# Patient Record
Sex: FEMALE
[De-identification: names, ages, dates, MRNs, and addresses within clinical notes are randomized per-mention and may not be internally consistent; named-entity substitution may affect disease eponyms.]

---

## 2019-05-06 ENCOUNTER — HOSPITAL ENCOUNTER (INPATIENT)
Dept: HOSPITAL 88 - FSED | Age: 74
LOS: 4 days | Discharge: HOME | DRG: 413 | End: 2019-05-10
Attending: INTERNAL MEDICINE | Admitting: INTERNAL MEDICINE
Payer: MEDICARE

## 2019-05-06 VITALS — SYSTOLIC BLOOD PRESSURE: 167 MMHG | DIASTOLIC BLOOD PRESSURE: 73 MMHG

## 2019-05-06 VITALS — SYSTOLIC BLOOD PRESSURE: 134 MMHG | DIASTOLIC BLOOD PRESSURE: 60 MMHG

## 2019-05-06 VITALS — SYSTOLIC BLOOD PRESSURE: 97 MMHG | DIASTOLIC BLOOD PRESSURE: 55 MMHG

## 2019-05-06 VITALS — WEIGHT: 133 LBS | HEIGHT: 62 IN | BODY MASS INDEX: 24.48 KG/M2

## 2019-05-06 DIAGNOSIS — Z79.4: ICD-10-CM

## 2019-05-06 DIAGNOSIS — E86.0: ICD-10-CM

## 2019-05-06 DIAGNOSIS — E11.9: ICD-10-CM

## 2019-05-06 DIAGNOSIS — K80.01: Primary | ICD-10-CM

## 2019-05-06 DIAGNOSIS — K82.8: ICD-10-CM

## 2019-05-06 DIAGNOSIS — K76.0: ICD-10-CM

## 2019-05-06 DIAGNOSIS — D64.9: ICD-10-CM

## 2019-05-06 PROCEDURE — 84466 ASSAY OF TRANSFERRIN: CPT

## 2019-05-06 PROCEDURE — 76705 ECHO EXAM OF ABDOMEN: CPT

## 2019-05-06 PROCEDURE — 82607 VITAMIN B-12: CPT

## 2019-05-06 PROCEDURE — 88304 TISSUE EXAM BY PATHOLOGIST: CPT

## 2019-05-06 PROCEDURE — 82728 ASSAY OF FERRITIN: CPT

## 2019-05-06 PROCEDURE — 82746 ASSAY OF FOLIC ACID SERUM: CPT

## 2019-05-06 PROCEDURE — 36415 COLL VENOUS BLD VENIPUNCTURE: CPT

## 2019-05-06 PROCEDURE — 82948 REAGENT STRIP/BLOOD GLUCOSE: CPT

## 2019-05-06 PROCEDURE — 74181 MRI ABDOMEN W/O CONTRAST: CPT

## 2019-05-06 PROCEDURE — 83540 ASSAY OF IRON: CPT

## 2019-05-06 PROCEDURE — 80048 BASIC METABOLIC PNL TOTAL CA: CPT

## 2019-05-06 PROCEDURE — 80053 COMPREHEN METABOLIC PANEL: CPT

## 2019-05-06 PROCEDURE — 99284 EMERGENCY DEPT VISIT MOD MDM: CPT

## 2019-05-06 PROCEDURE — 85025 COMPLETE CBC W/AUTO DIFF WBC: CPT

## 2019-05-06 PROCEDURE — 74150 CT ABDOMEN W/O CONTRAST: CPT

## 2019-05-06 PROCEDURE — 81003 URINALYSIS AUTO W/O SCOPE: CPT

## 2019-05-06 PROCEDURE — 80076 HEPATIC FUNCTION PANEL: CPT

## 2019-05-06 RX ADMIN — TAZOBACTAM SODIUM AND PIPERACILLIN SODIUM SCH GM: 375; 3 INJECTION, SOLUTION INTRAVENOUS at 22:00

## 2019-05-06 RX ADMIN — TAZOBACTAM SODIUM AND PIPERACILLIN SODIUM SCH GM: 375; 3 INJECTION, SOLUTION INTRAVENOUS at 18:27

## 2019-05-06 RX ADMIN — SODIUM CHLORIDE SCH MLS/HR: 9 INJECTION, SOLUTION INTRAVENOUS at 20:59

## 2019-05-06 NOTE — NUR
Spoke with Dr Gant via phone. Patient temp 101.4 orally, no order for tylenol. New order for 
Tylenol 650 mg po Q 6 hrs PRN temp or pain. Patient also requesting water or ice chips: keep 
patient NPO at this time.

## 2019-05-06 NOTE — XMS REPORT
Patient Summary Document

                             Created on: 2019



NEAL LUNA

External Reference #: [id number]

: 1945

Sex: Female



Demographics







                          Address                   [address] 

[place]  [address]

 

                          Home Phone                [phone]

 

                          Preferred Language        Unknown

 

                          Marital Status            Unknown

 

                          P[employer]al Affiliation     Unknown

 

                          Race                      Unknown

 

                                        Additional Race(s)  

 

                          Ethnic Group              Unknown





Author







                          Author                    [facility]

 

                          Address                   Unknown

 

                          Phone                     Unavailable







Care Team Providers







                    Care Team Member Name    Role                Phone

 

                    DICK WALDEN    Unavailable         Unavailable







Problems

This patient has no known problems.



Allergies, Adverse Reactions, Alerts

This patient has no known allergies or adverse reactions.



Medications

This patient has no known medications.



Results







           Test Description    Test Time    Test Comments    Text Results    Atomic Results    Result

 Comments

 

                 GALL BLADDER-HOPD    2019 16:44:00                                                      

                                                    [place] [address]      Patient Name: NEAL LUNA                                  
MR #: [id number]                     : 1945                            
      Age/Sex: 74/F  Acct #: [id number]                              Req #: 
[id number]  Adm Physician:                                                      
Ordered by: JOE WALDEN MD                            Report #: [id number]
       Location: [facility]                                    Room/Bed:              
      
___________________________________________________________________________________________________
   [id number]  Exam Date: 19          
                 Exam Time: 1550                                              
REPORT STATUS: Signed    EXAM:  GALL BLADDER-HOPD   DATE: 2019 12:00 AM   
 INDICATION:   Right upper quadrant pain   COMPARISON: CT abdomen and pelvis 
same day    TECHNIQUE: Transverse and longitudinal gray scale and color doppler 
sonographic   images of the upper abdomen were obtained.       FINDINGS:        
LIVER   12.9 cm in the right midclavicular line.   Normal echogenicity, normal 
contour, no masses.         GALLBLADDER   Multiple mobile shadowing echogenic c
alculi. No wall thickening or   pericholecystic fluid.   Negative sonographic 
Lizarraga's sign.      BILE DUCTS   No intra nor extra-hepatic biliary dilation.   
Common bile duct is mildly prominent, measuring 0.9 cm      PANCREAS: Visualized
portions are normal.      RIGHT KIDNEY: 9.7 cm   Echogenicity: Normal   
Collecting System:  No hydronephrosis   Stones:  None   Cyst/Mass: None         
VESSELS:   Aorta: Nonaneurysmal   Inferior Vena Cava: Patent   Main Portal Vein:
0.9 cm, normal size with hepatopetal flow.      FREE FLUID: None      
IMPRESSION:   Cholelithiasis without sonographic evidence of acute 
cholecystitis.      Mild prominence of the common bile duct may be age-related. 
Correlate with   serum bilirubin and consider outpatient MRCP for further 
evaluation if   warranted.       Signed by: Dr. Maurice Rashid M.D. on 2019 
4:48 PM        Dictated By: MAURICE RASHID MD  Electronically Signed By: MAURICE RASHID MD on 19  Transcribed By: LIZBET on 19       COPY 
TO:   JOE WALDEN MD               

 

                CT ABDOMEN WITHOUT-HOPD    2019 14:07:00                                                   

                                                       [place] [address]      Patient Name: NEAL LUNA                        
          MR #: [id number]                     : 1945                  
                Age/Sex: 74/F  Acct #: [id number]                             
Req #: [id number]  Adm Physician:                                               
      Ordered by: JOE WALDEN MD                            Report #: 
[id number]        Location: [facility]                                    Room/Bed:    
                
___________________________________________________________________________________________________
   Procedure: [id number] HOPD/CT ABDOMEN WITHOUT-HOPD  Exam Date: 19       
                    Exam Time: 1340                                             
REPORT STATUS: Signed    EXAMINATION: CT of the abdomen and pelvis without 
contrast.       TECHNIQUE: Spiral CT images of the abdomen and pelvis were 
performed from the   lung bases to the lesser trochanters.  No intravenous 
contrast was given per   referring physician request. Oral Gastrografin was 
administered. Coronal and   sagittal reformatted images were obtained.      
COMPARISON:  None.      CLINICAL HISTORY:Right lower quadrant pain and fever x2 
days           DISCUSSION: ABSENCE OF INTRAVENOUS CONTRAST DECREASES SENSITIVITY
FOR DETECTION   OF FOCAL LESIONS AND VASCULAR PATHOLOGY.      ABDOMEN/PELVIS:   
  LOWER THORAX: Minimal groundglass opacity in the dependent lower lobes   
compatible with subsegmental atelectasis.       HEPATOBILIARY:No focal hepatic 
lesion or intrahepatic biliary ductal   dilatation. There are multiple 
radiopaque calculi in the dependent portion of   the gallbladder, as well as 
within the gallbladder neck (series 2 image 29 and   series 301 image 42) 
without pericholecystic inflammation.      SPLEEN: No splenomegaly.      
PANCREAS: No focal masses or ductal dilatation.      ADRENALS: No adrenal 
nodules.      KIDNEYS/URETERS: No hydronephrosis, stones, or solid mass lesions.
     PELVIC ORGANS/BLADDER: Evaluation is limited due to beam hardening artifact
  from right femoral surgical hardware. The bladder is grossly unremarkable. The
  uterus is neutral in position with arcuate artery calcifications. No adnexal  
mass.      PERITONEUM/RETROPERITONEUM: No ascites. No pneumoperitoneum.      
LYMPH NODES: No intra-abdominal,retroperitoneal, pelvic or inguinal   
lymphadenopathy.      VESSELS: Limited evaluation without intravenous contrast. 
The abdominal aorta   is nonaneurysmal. Atherosclerotic calcification of the 
abdominopelvic arterial   system.      GI TRACT: The large bowel shows no 
distention or wall thickening. Gas and fecal   material is noted throughout. The
appendix is normal. The stomach is collapsed   with prominence of the rugal 
folds. No small bowel dilatation to suggest   obstruction.      BONES AND SOFT 
TISSUES: Surgical hardware in the proximal right femur. No   osseous destructive
lesions. Bilateral L4 pars interarticularis defects with 8   mm anterolisthesis 
of L4 over L5. No focal soft tissue abnormalities.      IMPRESSION:       No 
acute intra-abdominal or pelvic CT abnormalities.      Cholelithiasis without CT
findings of acute cholecystitis.      Atherosclerotic vascular disease.      
Signed by: Dr. Maurice Rashid M.D. on 2019 2:17 PM        Dictated By: MAURICE RASHID MD  Electronically Signed By: MAURICE RASHID MD on 19 1415  
Transcribed By: LIZBET on 19 1417       COPY TO:   JOE WALDEN MD

## 2019-05-06 NOTE — DIAGNOSTIC IMAGING REPORT
EXAMINATION: CT of the abdomen and pelvis without contrast.

 

TECHNIQUE: Spiral CT images of the abdomen and pelvis were performed from the

lung bases to the lesser trochanters.  No intravenous contrast was given per

referring physician request. Oral Gastrografin was administered. Coronal and

sagittal reformatted images were obtained.



COMPARISON:  None.



CLINICAL HISTORY:Right lower quadrant pain and fever x2 days

     

DISCUSSION: ABSENCE OF INTRAVENOUS CONTRAST DECREASES SENSITIVITY FOR DETECTION

OF FOCAL LESIONS AND VASCULAR PATHOLOGY.



ABDOMEN/PELVIS:



LOWER THORAX: Minimal groundglass opacity in the dependent lower lobes

compatible with subsegmental atelectasis. 



HEPATOBILIARY:No focal hepatic lesion or intrahepatic biliary ductal

dilatation. There are multiple radiopaque calculi in the dependent portion of

the gallbladder, as well as within the gallbladder neck (series 2 image 29 and

series 301 image 42) without pericholecystic inflammation.



SPLEEN: No splenomegaly.



PANCREAS: No focal masses or ductal dilatation.



ADRENALS: No adrenal nodules.



KIDNEYS/URETERS: No hydronephrosis, stones, or solid mass lesions.



PELVIC ORGANS/BLADDER: Evaluation is limited due to beam hardening artifact

from right femoral surgical hardware. The bladder is grossly unremarkable. The

uterus is neutral in position with arcuate artery calcifications. No adnexal

mass.



PERITONEUM/RETROPERITONEUM: No ascites. No pneumoperitoneum.



LYMPH NODES: No intra-abdominal,retroperitoneal, pelvic or inguinal

lymphadenopathy.



VESSELS: Limited evaluation without intravenous contrast. The abdominal aorta

is nonaneurysmal. Atherosclerotic calcification of the abdominopelvic arterial

system.



GI TRACT: The large bowel shows no distention or wall thickening. Gas and fecal

material is noted throughout. The appendix is normal. The stomach is collapsed

with prominence of the rugal folds. No small bowel dilatation to suggest

obstruction.



BONES AND SOFT TISSUES: Surgical hardware in the proximal right femur. No

osseous destructive lesions. Bilateral L4 pars interarticularis defects with 8

mm anterolisthesis of L4 over L5. No focal soft tissue abnormalities.



IMPRESSION: 



No acute intra-abdominal or pelvic CT abnormalities.



Cholelithiasis without CT findings of acute cholecystitis.



Atherosclerotic vascular disease.



Signed by: Dr. Steve Colin M.D. on 5/6/2019 2:17 PM

## 2019-05-06 NOTE — NUR
Patient arrived to unit per ems from [facility] with daughter at side. Transferred to bed. 
Oriented to room and environment. Instructed to call for assistance or on the onset of pain 
or SOB. Call light within reach.

## 2019-05-06 NOTE — NUR
Daughter Domitila to bring home medications for correct dosage on medications and type of 
insulin. Education provided on importance of correct list of home medications. Will bring 
list.

## 2019-05-06 NOTE — DIAGNOSTIC IMAGING REPORT
EXAM: US GALL BLADDER-HOPD

DATE: 5/6/2019 12:00 AM  

INDICATION:   Right upper quadrant pain

COMPARISON: CT abdomen and pelvis same day 

TECHNIQUE: Transverse and longitudinal gray scale and color doppler sonographic

images of the upper abdomen were obtained. 



FINDINGS:     

LIVER

12.9 cm in the right midclavicular line.

Normal echogenicity, normal contour, no masses.





GALLBLADDER

Multiple mobile shadowing echogenic calculi. No wall thickening or

pericholecystic fluid.

Negative sonographic Lizarraga's sign.



BILE DUCTS

No intra nor extra-hepatic biliary dilation.

Common bile duct is mildly prominent, measuring 0.9 cm



PANCREAS: Visualized portions are normal.



RIGHT KIDNEY: 9.7 cm

Echogenicity: Normal

Collecting System:  No hydronephrosis

Stones:  None

Cyst/Mass: None





VESSELS:

Aorta: Nonaneurysmal

Inferior Vena Cava: Patent

Main Portal Vein: 0.9 cm, normal size with hepatopetal flow.



FREE FLUID: None



IMPRESSION:

Cholelithiasis without sonographic evidence of acute cholecystitis.



Mild prominence of the common bile duct may be age-related. Correlate with

serum bilirubin and consider outpatient MRCP for further evaluation if

warranted. 



Signed by: Dr. Steve Colin M.D. on 5/6/2019 4:48 PM

## 2019-05-06 NOTE — NUR
Patient requesting water or ice chips. Informed patient that current order for NPO, will 
call MD to check. Verbalized understanding.

## 2019-05-07 VITALS — SYSTOLIC BLOOD PRESSURE: 104 MMHG | DIASTOLIC BLOOD PRESSURE: 47 MMHG

## 2019-05-07 VITALS — DIASTOLIC BLOOD PRESSURE: 52 MMHG | SYSTOLIC BLOOD PRESSURE: 104 MMHG

## 2019-05-07 VITALS — SYSTOLIC BLOOD PRESSURE: 108 MMHG | DIASTOLIC BLOOD PRESSURE: 55 MMHG

## 2019-05-07 VITALS — DIASTOLIC BLOOD PRESSURE: 47 MMHG | SYSTOLIC BLOOD PRESSURE: 104 MMHG

## 2019-05-07 VITALS — DIASTOLIC BLOOD PRESSURE: 48 MMHG | SYSTOLIC BLOOD PRESSURE: 96 MMHG

## 2019-05-07 VITALS — SYSTOLIC BLOOD PRESSURE: 99 MMHG | DIASTOLIC BLOOD PRESSURE: 50 MMHG

## 2019-05-07 VITALS — DIASTOLIC BLOOD PRESSURE: 57 MMHG | SYSTOLIC BLOOD PRESSURE: 115 MMHG

## 2019-05-07 LAB
ALBUMIN SERPL-MCNC: 2.5 G/DL (ref 3.5–5)
ALBUMIN/GLOB SERPL: 0.8 {RATIO} (ref 0.8–2)
ALP SERPL-CCNC: 101 IU/L (ref 40–150)
ALT SERPL-CCNC: 11 IU/L (ref 0–55)
ANION GAP SERPL CALC-SCNC: 11.1 MMOL/L (ref 8–16)
BASOPHILS # BLD AUTO: 0.1 10*3/UL (ref 0–0.1)
BASOPHILS NFR BLD AUTO: 0.6 % (ref 0–1)
BUN SERPL-MCNC: 34 MG/DL (ref 7–26)
BUN/CREAT SERPL: 23 (ref 6–25)
CALCIUM SERPL-MCNC: 8.3 MG/DL (ref 8.4–10.2)
CHLORIDE SERPL-SCNC: 102 MMOL/L (ref 98–107)
CO2 SERPL-SCNC: 24 MMOL/L (ref 22–29)
DEPRECATED NEUTROPHILS # BLD AUTO: 9.2 10*3/UL (ref 2.1–6.9)
EGFRCR SERPLBLD CKD-EPI 2021: 35 ML/MIN (ref 60–?)
EOSINOPHIL # BLD AUTO: 0.3 10*3/UL (ref 0–0.4)
EOSINOPHIL NFR BLD AUTO: 2.3 % (ref 0–6)
ERYTHROCYTE [DISTWIDTH] IN CORD BLOOD: 12.7 % (ref 11.7–14.4)
GLOBULIN PLAS-MCNC: 3.1 G/DL (ref 2.3–3.5)
GLUCOSE SERPLBLD-MCNC: 177 MG/DL (ref 74–118)
HCT VFR BLD AUTO: 27.1 % (ref 34.2–44.1)
HGB BLD-MCNC: 8.9 G/DL (ref 12–16)
LYMPHOCYTES # BLD: 1.2 10*3/UL (ref 1–3.2)
LYMPHOCYTES NFR BLD AUTO: 10 % (ref 18–39.1)
MCH RBC QN AUTO: 30.6 PG (ref 28–32)
MCHC RBC AUTO-ENTMCNC: 32.8 G/DL (ref 31–35)
MCV RBC AUTO: 93.1 FL (ref 81–99)
MONOCYTES # BLD AUTO: 1.2 10*3/UL (ref 0.2–0.8)
MONOCYTES NFR BLD AUTO: 9.6 % (ref 4.4–11.3)
NEUTS SEG NFR BLD AUTO: 77 % (ref 38.7–80)
PLATELET # BLD AUTO: 202 X10E3/UL (ref 140–360)
POTASSIUM SERPL-SCNC: 4.1 MMOL/L (ref 3.5–5.1)
RBC # BLD AUTO: 2.91 X10E6/UL (ref 3.6–5.1)
SODIUM SERPL-SCNC: 133 MMOL/L (ref 136–145)

## 2019-05-07 RX ADMIN — SODIUM CHLORIDE SCH MLS/HR: 9 INJECTION, SOLUTION INTRAVENOUS at 11:09

## 2019-05-07 RX ADMIN — TAZOBACTAM SODIUM AND PIPERACILLIN SODIUM SCH GM: 375; 3 INJECTION, SOLUTION INTRAVENOUS at 11:09

## 2019-05-07 RX ADMIN — TAZOBACTAM SODIUM AND PIPERACILLIN SODIUM SCH GM: 375; 3 INJECTION, SOLUTION INTRAVENOUS at 17:30

## 2019-05-07 RX ADMIN — INSULIN LISPRO SCH UNIT: 100 INJECTION, SOLUTION INTRAVENOUS; SUBCUTANEOUS at 21:00

## 2019-05-07 RX ADMIN — SODIUM CHLORIDE SCH MLS/HR: 9 INJECTION, SOLUTION INTRAVENOUS at 20:57

## 2019-05-07 RX ADMIN — SODIUM CHLORIDE SCH MLS/HR: 9 INJECTION, SOLUTION INTRAVENOUS at 01:34

## 2019-05-07 RX ADMIN — SODIUM CHLORIDE SCH MLS/HR: 9 INJECTION, SOLUTION INTRAVENOUS at 17:51

## 2019-05-07 RX ADMIN — INSULIN LISPRO SCH UNIT: 100 INJECTION, SOLUTION INTRAVENOUS; SUBCUTANEOUS at 16:30

## 2019-05-07 RX ADMIN — TAZOBACTAM SODIUM AND PIPERACILLIN SODIUM SCH GM: 375; 3 INJECTION, SOLUTION INTRAVENOUS at 01:34

## 2019-05-07 RX ADMIN — INSULIN LISPRO SCH UNIT: 100 INJECTION, SOLUTION INTRAVENOUS; SUBCUTANEOUS at 12:25

## 2019-05-07 NOTE — NUR
Patient arrived to the floor via wheelchair. She is being transferred from room 175. She is 
awake alert and oriented x3. Not in any distress. She has several family members present in 
the room. Upper sorbian speaking only.  was used to orient patient and family to the 
room and NPO status after midnight and procedure tomorrow. Patient and family verbalized 
understanding. They deny needing anything at this time, call light in reach

## 2019-05-07 NOTE — NUR
RECEIVED PATIENT. PATIENT IS AAOX3. RESP EVEN AND UNLABORED. NO ACUTE DISTRESS NOTED. 
PATIENT DENIES OF ANY PAIN OR DISCOMFORT AT THIS TIME. IV FLUID INFUSING. FAMILY AT BED 
SIDE. BED LOW/LOCKED. CONTINUE TO MONITOR CLOSELY

## 2019-05-07 NOTE — NUR
patient back from MRI MRCP, Stable, transferred to [facility], not in any distress

## 2019-05-07 NOTE — HISTORY AND PHYSICAL
CHIEF COMPLAINT:  Abdominal pain, right upper quadrant for x2 days.

 

HISTORY OF PRESENT ILLNESS:  The patient is a 74-year-old female with right upper

quadrant abdominal pain associated with nausea and vomiting.  This has been going on for

the past 2-3 days.  The patient came to the hospital.  Signs and symptoms are consistent

with acute cholecystitis.  Patient does have fever of 101-102 Fahrenheit.  The patient

is stable at this time pending for surgical intervention. 

 

PAST MEDICAL HISTORY:  Diabetes mellitus type 2, chronic kidney disease.

 

PAST SURGICAL HISTORY:  Foot surgery.

 

SOCIAL HISTORY:  The patient does not smoke or use alcohol, no recreational drugs.

 

ALLERGIES:  NO KNOWN ALLERGIES.

 

HOME MEDICATION:  Glyburide and metformin.

 

REVIEW OF SYSTEMS:

Abdominal pain, nausea and vomiting.

 

PHYSICAL EXAMINATION:

VITAL SIGNS:  Temperature is 101.4, blood pressure 142/51, pulse rate is 109,

respirations 18. 

GENERAL:  The patient is not in acute distress.  She is in pain. 

HEENT:  Normocephalic, atraumatic.  Anicteric. 

NECK:  Supple grossly. 

PULMONARY:  Diminished breath sounds bilaterally. 

CARDIOVASCULAR:  S1 and S2.  Regular rate and rhythm. 

ABDOMEN:  Tenderness with some guarding in right upper quadrant and right mid quadrant. 

EXTREMITIES:  No cyanosis or edema. 

NEUROLOGIC:  No gross focal deficit.

LABORATORY DATA:  WBC is 11.9, hemoglobin 8.9, hematocrit 27.1, and platelets 202. 

 

Chemistry; sodium 133, potassium 4.1, chloride 102, bicarb 24, BUN is 34, creatinine

1.47, glucose 177. 

 

IMAGING:  Abdominal CT scan showed consistent with cholelithiasis.

 

IMPRESSION:  Acute cholecystitis associated with pain, fever, and leukocytosis.

 

PLAN:  Dr. Kapoor will take the patient to have her gallbladder taken out.  In the

meantime, continue with IV antibiotics.  Tylenol.  Check blood culture. 

 

 

 

 

______________________________

MD CHEO Karimi/PREM

D:  05/07/2019 08:32:52

T:  05/07/2019 15:58:07

Job #:  [id number]

## 2019-05-07 NOTE — DIAGNOSTIC IMAGING REPORT
MRCP



CPT code: 21297



History: Abdominal pain, right upper quadrant pain



Comparison: None.



Technique: Multiplanar, multisequence images of the abdomen were obtained per

MRCP protocol.   3D volume rendered reformation images of the biliary tree were

performed.  No intravenous gadolinium was administered.



Findings: 

Multiple images are motion degraded.



Biliary tree: 

The intrahepatic ducts are distended, particularly in the left lobe without

beading or narrowing. The proximal most portion of the right common hepatic

duct is mildly distended. The common hepatic duct measures approximately 6 mm

in diameter.

The cystic duct is poorly visualized.

The proximal common bile duct measures 10 mm. The mid common bile duct measures

8 mm. The distal common bile duct measures 7 mm with squaring of the shoulders

at the ampulla. There are no intraluminal filling defects.



Pancreas duct: The proximal pancreas duct in the head measures 4 mm. The

pancreas duct in the body and tail are not dilated.



Gallbladder: Present and is very well-distended. A dependently layering

gallstone measures 7 mm. No gallbladder wall thickening. There is a trace

amount of nonspecific pericholecystic fluid. No edema of the adjacent hepatic

parenchyma.



Liver: No discrete mass. Decreased signal on opposed phase sequence suggestive

of steatosis.

 

Spleen: Normal size and signal. No mass.



Pancreas: Normal T2 signal. No mass. 

Kidneys: No hydronephrosis. No discrete mass. 

Adrenal glands: No mass 



Lymph nodes: No lymphadenopathy. There is focal increased T2 signal to the left

of the infrarenal aorta measuring 15 x 7 mm. This may be a part of the

parasympathetic ganglion. No defined lymph nodes.



Bowel: Stomach is normal. The visualized portions of the small bowel

demonstrate a diverticulum in the third portion of the duodenum. Remainder of

the small bowel is normal in diameter with normal wall thickness. The

visualized portions of the large bowel are normal in diameter with normal wall

thickness.



Vasculature: Great vessels are patent and normal in morphology.



Peritoneum/retroperitoneum: Trace amount of perihepatic and perinephric fluid.



Lung bases: Small pleural effusions and bibasilar atelectasis. Visualized

portion of the mediastinum are unremarkable.



Bones: Normal marrow signal. No focal osseous lesions.



IMPRESSION:



1. Diffuse distention of the common bile duct, proximal intrahepatic ducts, and

proximal pancreas duct without evidence of intraluminal filling defect,

beading, or narrowing. No evidence of choledocholithiasis. Ampullary stenosis

cannot be excluded. No obstructing masses in the pancreas head.



2. Well distended gallbladder containing subcentimeter gallstone. Gallbladder

distention may be from fasting state. HIDA scan in nuclear medicine can assess

gallbladder function.



3.  Mild steatosis.



4.  Trace pleural effusions and small amount of abdominal ascites.



Thank you for your referral. 



Signed by: Dr. Christiana Liu MD on 5/7/2019 2:20 PM

## 2019-05-07 NOTE — CONSULTATION
DATE OF CONSULTATION:  05/07/2019  

 

ADDITIONAL REFERRING PHYSICIAN:  Dr. Blaise Samaniego.

 

HISTORY OF PRESENT ILLNESS:  The patient is a 74-year-old female presenting with

complaints of right upper quadrant abdominal pain.  Says the pain started about three

days ago, but was somewhat less now.  She says the pain radiated to her back.  She had

no associated nausea or vomiting.  She had some diarrhea and also some fever.

Evaluation in the emergency room revealed gallstones, but no inflammation around the

gallbladder on CT or ultrasound.  The patient says she has had similar symptoms

previously.  There are no symptoms of jaundice.  Evaluation in the emergency room did

reveal gallstones. 

 

PAST MEDICAL HISTORY:  Significant for diabetes.

 

PAST SURGICAL HISTORY:  The only previous surgery is foot surgery.

 

ALLERGIES:  SHE HAS NO KNOWN ALLERGIES.

 

MEDICATIONS:  At home; glyburide, metformin, and insulin.

 

FAMILY HISTORY:  Noncontributory.

 

SOCIAL HISTORY:  The patient does not smoke cigarettes.  Does not drink alcohol.

 

REVIEW OF SYSTEMS:

As stated above, otherwise was negative.

 

PHYSICAL EXAMINATION:

GENERAL:  The patient is awake, alert, in no distress. 

VITAL SIGNS:  Revealed temperature 101.4 last evening.  She is afebrile now.  Heart rate

is normal.  Blood pressure is normal. 

HEENT:  There was no scleral icterus. 

NECK:  No masses. 

LUNGS:  Equal breath sounds, clear bilaterally. 

CARDIAC:  Regular rate and rhythm with no murmur. 

ABDOMEN:  Tender in the right upper quadrant.  There was no mass.  There was no

organomegaly.  There were no signs of peritonitis. 

EXTREMITIES:  Have no edema. 

NEUROLOGIC:  Grossly intact.

LABS:  White blood cell count is 12,000, hemoglobin 8.9, and hematocrit 27.  Chemistries

revealed mildly elevated BUN and creatinine of 34 and 1.47.  The bilirubin was 1.1.

Other liver function tests were normal. 

 

ASSESSMENT:  A 74-year-old female with right upper quadrant abdominal pain radiating to

her back with fever and findings of gallstones on imaging studies, likely this is due to

cholecystitis.  She is to be evaluated further with MRCP, which will likely benefit from

cholecystectomy.  This was explained to the patient and the proposed surgery was

explained to the patient. 

 

Thank you for asking me to see Ms. Lee.

 

 

 

 

______________________________

MD PADILLA Alves/PREM

D:  05/07/2019 06:33:58

T:  05/07/2019 13:18:54

Job #:  [id number]

## 2019-05-08 VITALS — SYSTOLIC BLOOD PRESSURE: 116 MMHG | DIASTOLIC BLOOD PRESSURE: 55 MMHG

## 2019-05-08 VITALS — DIASTOLIC BLOOD PRESSURE: 65 MMHG | SYSTOLIC BLOOD PRESSURE: 145 MMHG

## 2019-05-08 VITALS — SYSTOLIC BLOOD PRESSURE: 112 MMHG | DIASTOLIC BLOOD PRESSURE: 56 MMHG

## 2019-05-08 VITALS — DIASTOLIC BLOOD PRESSURE: 54 MMHG | SYSTOLIC BLOOD PRESSURE: 109 MMHG

## 2019-05-08 VITALS — SYSTOLIC BLOOD PRESSURE: 112 MMHG | DIASTOLIC BLOOD PRESSURE: 57 MMHG

## 2019-05-08 VITALS — DIASTOLIC BLOOD PRESSURE: 56 MMHG | SYSTOLIC BLOOD PRESSURE: 123 MMHG

## 2019-05-08 LAB
ALBUMIN SERPL-MCNC: 2.3 G/DL (ref 3.5–5)
ALBUMIN/GLOB SERPL: 0.8 {RATIO} (ref 0.8–2)
ALP SERPL-CCNC: 90 IU/L (ref 40–150)
ALT SERPL-CCNC: 7 IU/L (ref 0–55)
ANION GAP SERPL CALC-SCNC: 14 MMOL/L (ref 8–16)
BASOPHILS # BLD AUTO: 0.1 10*3/UL (ref 0–0.1)
BASOPHILS NFR BLD AUTO: 0.5 % (ref 0–1)
BUN SERPL-MCNC: 19 MG/DL (ref 7–26)
BUN/CREAT SERPL: 18 (ref 6–25)
CALCIUM SERPL-MCNC: 8.2 MG/DL (ref 8.4–10.2)
CHLORIDE SERPL-SCNC: 107 MMOL/L (ref 98–107)
CO2 SERPL-SCNC: 20 MMOL/L (ref 22–29)
DEPRECATED NEUTROPHILS # BLD AUTO: 6.5 10*3/UL (ref 2.1–6.9)
EGFRCR SERPLBLD CKD-EPI 2021: 50 ML/MIN (ref 60–?)
EOSINOPHIL # BLD AUTO: 0.5 10*3/UL (ref 0–0.4)
EOSINOPHIL NFR BLD AUTO: 5.8 % (ref 0–6)
ERYTHROCYTE [DISTWIDTH] IN CORD BLOOD: 12.9 % (ref 11.7–14.4)
GLOBULIN PLAS-MCNC: 3 G/DL (ref 2.3–3.5)
GLUCOSE SERPLBLD-MCNC: 121 MG/DL (ref 74–118)
HCT VFR BLD AUTO: 26.9 % (ref 34.2–44.1)
HGB BLD-MCNC: 8.6 G/DL (ref 12–16)
LYMPHOCYTES # BLD: 1.2 10*3/UL (ref 1–3.2)
LYMPHOCYTES NFR BLD AUTO: 12.9 % (ref 18–39.1)
MCH RBC QN AUTO: 30.7 PG (ref 28–32)
MCHC RBC AUTO-ENTMCNC: 32 G/DL (ref 31–35)
MCV RBC AUTO: 96.1 FL (ref 81–99)
MONOCYTES # BLD AUTO: 0.9 10*3/UL (ref 0.2–0.8)
MONOCYTES NFR BLD AUTO: 9.5 % (ref 4.4–11.3)
NEUTS SEG NFR BLD AUTO: 70.9 % (ref 38.7–80)
PLATELET # BLD AUTO: 194 X10E3/UL (ref 140–360)
POTASSIUM SERPL-SCNC: 4 MMOL/L (ref 3.5–5.1)
RBC # BLD AUTO: 2.8 X10E6/UL (ref 3.6–5.1)
SODIUM SERPL-SCNC: 137 MMOL/L (ref 136–145)

## 2019-05-08 PROCEDURE — 0FT44ZZ RESECTION OF GALLBLADDER, PERCUTANEOUS ENDOSCOPIC APPROACH: ICD-10-PCS | Performed by: SURGERY

## 2019-05-08 PROCEDURE — 0FJB0ZZ INSPECTION OF HEPATOBILIARY DUCT, OPEN APPROACH: ICD-10-PCS | Performed by: SURGERY

## 2019-05-08 RX ADMIN — SODIUM CHLORIDE SCH MLS/HR: 9 INJECTION, SOLUTION INTRAVENOUS at 22:27

## 2019-05-08 RX ADMIN — INSULIN LISPRO SCH UNIT: 100 INJECTION, SOLUTION INTRAVENOUS; SUBCUTANEOUS at 16:30

## 2019-05-08 RX ADMIN — SODIUM CHLORIDE SCH MLS/HR: 9 INJECTION, SOLUTION INTRAVENOUS at 15:21

## 2019-05-08 RX ADMIN — TAZOBACTAM SODIUM AND PIPERACILLIN SODIUM SCH GM: 375; 3 INJECTION, SOLUTION INTRAVENOUS at 19:14

## 2019-05-08 RX ADMIN — SODIUM CHLORIDE SCH MLS/HR: 9 INJECTION, SOLUTION INTRAVENOUS at 06:13

## 2019-05-08 RX ADMIN — INSULIN LISPRO SCH UNIT: 100 INJECTION, SOLUTION INTRAVENOUS; SUBCUTANEOUS at 21:00

## 2019-05-08 RX ADMIN — TAZOBACTAM SODIUM AND PIPERACILLIN SODIUM SCH GM: 375; 3 INJECTION, SOLUTION INTRAVENOUS at 03:30

## 2019-05-08 RX ADMIN — INSULIN LISPRO SCH UNIT: 100 INJECTION, SOLUTION INTRAVENOUS; SUBCUTANEOUS at 07:30

## 2019-05-08 RX ADMIN — TAZOBACTAM SODIUM AND PIPERACILLIN SODIUM SCH GM: 375; 3 INJECTION, SOLUTION INTRAVENOUS at 09:33

## 2019-05-08 RX ADMIN — INSULIN LISPRO SCH UNIT: 100 INJECTION, SOLUTION INTRAVENOUS; SUBCUTANEOUS at 11:30

## 2019-05-08 NOTE — NUR
Patient returned from OR, S/P Lap Herminia, pains well managed, on ADA diet at this time and 
call light within reach, VSS and will monitor.

## 2019-05-08 NOTE — NUR
Received patient this morning and in bed, no resp distress, rounds by attending and consult 
to Dr. Castillo and called at this time. Call to Dr. Kapoor to clarify if patient will need 
ERCP per Dr. Gant prior to surgery. Will monitor.

## 2019-05-08 NOTE — OPERATIVE REPORT
DATE OF PROCEDURE:  05/08/2019

 

SURGEON:  Steve Kapoor MD

 

PREOPERATIVE DIAGNOSES:  Acute cholecystitis, cholelithiasis.

 

POSTOPERATIVE DIAGNOSES:  Acute cholecystitis, cholelithiasis.

 

PROCEDURES:  Diagnostic laparoscopy, laparoscopic cholecystectomy, attempted

cholangiogram. 

 

ASSISTANT:  None.

 

ANESTHESIA:  General.

 

INDICATIONS AND FINDINGS:  The patient is a 74-year-old female, admitted to the hospital

with complaints of fever and right upper quadrant abdominal pain.  Workup revealed

gallstones.  At surgery, the patient's gallbladder was edematous.  It was having at

least one stone.  Cystic duct was also edematous.  An attempted cholangiogram was made,

but cholangiocatheter would not pass, so there was no definite stone within the cystic

duct.  Common bile duct was well seen and this was about 8 mm in diameter.  Liver

appeared normal.  There were some adhesions involving the omentum and lower abdomen

otherwise appeared normal. 

 

TECHNIQUE:  After adequate general endotracheal anesthesia with the patient in supine

position, the abdomen was prepped and draped in sterile fashion with ChloraPrep

solution.  Skin in the umbilicus was infiltrated with 0.5% Marcaine.  Incision made in

the umbilicus, abdominal wall was elevated, and Veress needle was introduced.

Pneumoperitoneum was then created.  A 10 mm trocar and cannula was then passed through

the umbilical wound.  Laparoscopic camera was introduced.  Initial laparoscopy revealed

some adhesions involving the omentum.  Gallbladder was noted to be distended and

somewhat edematous.  Liver, stomach, and lower abdomen appeared normal.  A 10 mm trocar

and cannula was placed in the epigastrium, two 5 mm trocars and cannulas were placed in

right upper quadrant; these were placed under direct vision.  Fundus of the gallbladder

was grasped and retracted superiorly.  There was some adhesions over the neck of the

gallbladder involving the omentum which were lysed.  Neck of the gallbladder was grasped

and retracted laterally.  Peritoneum over the neck of the gallbladder was incised.  The

gallbladder cystic duct junction was dissected free.  Cystic artery was also dissected

free.  Cystic artery was divided between hemoclips close to the gallbladder.  A clip was

then placed on the cystic duct close to the gallbladder.  An incision was made in the

cystic duct just below this clip and percutaneous entry with cholangiocatheter was

placed in the cystic duct, however, would not pass.  Cystic duct was milked back towards

the opening in the cystic duct, but there was no stone.  Several attempts were made to

pass the cholangiocatheter, but it would not pass.  There was some edema there, so the

edema may have been preventing the passage of the catheter.  Attempt to do the

cholangiogram was then aborted.  The cystic duct was then divided between hemoclips with

three clips being left on the common bile duct side.  The cystic duct was divided.

There was a posterior branch of cystic artery, which was divided between hemoclips.  The

gallbladder was then dissected free from the liver using scissors and electrocautery.

Once it was entirely free, it was placed into an Endopouch and brought out through the

epigastric cannula, at least one stone palpable.  Gallbladder bed was inspected for

hemostasis which was seen to be adequate.  It was irrigated with saline, all fluid

aspirated, inspected once again for hemostasis which was seen to be adequate.

Instruments and cannulas were removed.  Pneumoperitoneum was evacuated.  Wounds were

then closed.  Fascia in the umbilical and epigastric wound closed with 0 Vicryl.  Skin

to all wounds closed with staples.  Sterile dressings applied to each wound.  The

patient tolerated the procedure well.  Estimated 

blood loss was 10 mL.  There were no complications.  All counts were correct.  The

patient was taken to the recovery room in satisfactory condition. 

 

 

 

 

______________________________

MD PADILLA Alves/PREM

D:  05/08/2019 13:33:33

T:  05/08/2019 19:30:48

Job #:  [id number]

## 2019-05-08 NOTE — NUR
Call back from Dr. Kapoor and states does not think finding on MRCP is significant for ERCP 
but he will talk with Dr. Calderón still plans to proceed with surgery today

## 2019-05-09 VITALS — SYSTOLIC BLOOD PRESSURE: 112 MMHG | DIASTOLIC BLOOD PRESSURE: 56 MMHG

## 2019-05-09 VITALS — DIASTOLIC BLOOD PRESSURE: 60 MMHG | SYSTOLIC BLOOD PRESSURE: 134 MMHG

## 2019-05-09 VITALS — DIASTOLIC BLOOD PRESSURE: 51 MMHG | SYSTOLIC BLOOD PRESSURE: 104 MMHG

## 2019-05-09 VITALS — DIASTOLIC BLOOD PRESSURE: 58 MMHG | SYSTOLIC BLOOD PRESSURE: 117 MMHG

## 2019-05-09 VITALS — DIASTOLIC BLOOD PRESSURE: 51 MMHG | SYSTOLIC BLOOD PRESSURE: 103 MMHG

## 2019-05-09 VITALS — DIASTOLIC BLOOD PRESSURE: 53 MMHG | SYSTOLIC BLOOD PRESSURE: 107 MMHG

## 2019-05-09 LAB
ALBUMIN SERPL-MCNC: 2.1 G/DL (ref 3.5–5)
ALBUMIN/GLOB SERPL: 0.7 {RATIO} (ref 0.8–2)
ALP SERPL-CCNC: 81 IU/L (ref 40–150)
ALT SERPL-CCNC: 15 IU/L (ref 0–55)
ANION GAP SERPL CALC-SCNC: 16.3 MMOL/L (ref 8–16)
BASOPHILS # BLD AUTO: 0 10*3/UL (ref 0–0.1)
BASOPHILS NFR BLD AUTO: 0.1 % (ref 0–1)
BUN SERPL-MCNC: 25 MG/DL (ref 7–26)
BUN/CREAT SERPL: 18 (ref 6–25)
CALCIUM SERPL-MCNC: 8.2 MG/DL (ref 8.4–10.2)
CHLORIDE SERPL-SCNC: 110 MMOL/L (ref 98–107)
CO2 SERPL-SCNC: 13 MMOL/L (ref 22–29)
DEPRECATED NEUTROPHILS # BLD AUTO: 8.5 10*3/UL (ref 2.1–6.9)
EGFRCR SERPLBLD CKD-EPI 2021: 36 ML/MIN (ref 60–?)
EOSINOPHIL # BLD AUTO: 0 10*3/UL (ref 0–0.4)
EOSINOPHIL NFR BLD AUTO: 0.1 % (ref 0–6)
ERYTHROCYTE [DISTWIDTH] IN CORD BLOOD: 13 % (ref 11.7–14.4)
FERRITIN SERPL-MCNC: 223.72 NG/ML (ref 4.63–204)
FOLATE SERPL-MCNC: 14.4 NG/ML (ref 7–15.4)
GLOBULIN PLAS-MCNC: 2.9 G/DL (ref 2.3–3.5)
GLUCOSE SERPLBLD-MCNC: 331 MG/DL (ref 74–118)
HCT VFR BLD AUTO: 26 % (ref 34.2–44.1)
HGB BLD-MCNC: 8.4 G/DL (ref 12–16)
IRON SATN MFR SERPL: 12 % (ref 15–50)
IRON SERPL-MCNC: 12 UG/DL (ref 50–170)
LYMPHOCYTES # BLD: 0.7 10*3/UL (ref 1–3.2)
LYMPHOCYTES NFR BLD AUTO: 7.1 % (ref 18–39.1)
MCH RBC QN AUTO: 30.5 PG (ref 28–32)
MCHC RBC AUTO-ENTMCNC: 32.3 G/DL (ref 31–35)
MCV RBC AUTO: 94.5 FL (ref 81–99)
MONOCYTES # BLD AUTO: 0.7 10*3/UL (ref 0.2–0.8)
MONOCYTES NFR BLD AUTO: 7.4 % (ref 4.4–11.3)
NEUTS SEG NFR BLD AUTO: 84.8 % (ref 38.7–80)
PLATELET # BLD AUTO: 225 X10E3/UL (ref 140–360)
POTASSIUM SERPL-SCNC: 4.3 MMOL/L (ref 3.5–5.1)
RBC # BLD AUTO: 2.75 X10E6/UL (ref 3.6–5.1)
SODIUM SERPL-SCNC: 135 MMOL/L (ref 136–145)
TIBC SERPL-MCNC: 102 UG/DL (ref 261–478)
TRANSFERRIN SERPL-MCNC: 73 MG/DL (ref 180–382)
VIT B12 BLD-MCNC: > 2000 PG/ML (ref 213–816)

## 2019-05-09 RX ADMIN — SODIUM CHLORIDE SCH MLS/HR: 9 INJECTION, SOLUTION INTRAVENOUS at 19:26

## 2019-05-09 RX ADMIN — TAZOBACTAM SODIUM AND PIPERACILLIN SODIUM SCH GM: 375; 3 INJECTION, SOLUTION INTRAVENOUS at 18:32

## 2019-05-09 RX ADMIN — TAZOBACTAM SODIUM AND PIPERACILLIN SODIUM SCH GM: 375; 3 INJECTION, SOLUTION INTRAVENOUS at 02:43

## 2019-05-09 RX ADMIN — INSULIN LISPRO SCH UNIT: 100 INJECTION, SOLUTION INTRAVENOUS; SUBCUTANEOUS at 12:03

## 2019-05-09 RX ADMIN — TAZOBACTAM SODIUM AND PIPERACILLIN SODIUM SCH GM: 375; 3 INJECTION, SOLUTION INTRAVENOUS at 10:30

## 2019-05-09 RX ADMIN — INSULIN LISPRO SCH UNIT: 100 INJECTION, SOLUTION INTRAVENOUS; SUBCUTANEOUS at 20:34

## 2019-05-09 RX ADMIN — INSULIN LISPRO SCH UNIT: 100 INJECTION, SOLUTION INTRAVENOUS; SUBCUTANEOUS at 07:30

## 2019-05-09 RX ADMIN — INSULIN LISPRO SCH UNIT: 100 INJECTION, SOLUTION INTRAVENOUS; SUBCUTANEOUS at 16:30

## 2019-05-09 RX ADMIN — SODIUM CHLORIDE SCH MLS/HR: 9 INJECTION, SOLUTION INTRAVENOUS at 11:39

## 2019-05-09 NOTE — CONSULTATION
DATE OF CONSULTATION:  05/08/2019  

 

HISTORY OF PRESENT ILLNESS:  This is a 74-year-old lady, who presented to the hospital

because of abdominal pain.  This mainly is in the right upper quadrant area, along with

some nausea and vomiting.  The patient also has some fever.  Her workup so far reveal

that she has anemia with a hemoglobin around 8.9 on admission and also gallstones on the

CAT scan.  The ultrasound shows marked common duct dilatations.  However, the MRCP was

negative for filling defect, except there is mild steatosis.  She underwent

cholecystectomy earlier today and currently she is doing well. 

 

PAST MEDICAL HISTORY:  Her medical problem is significant for history of diabetes,

history of chronic renal disease, status post foot surgery. 

 

ALLERGIES:  NONE.

 

HOME MEDICATIONS:  Include glyburide as well as metformin.

 

SOCIAL HISTORY:  No smoking or alcohol use.

 

FAMILY HISTORY:  Noncontributory.

 

REVIEW OF SYSTEMS:

Denies any chest pain or shortness of breath.  Denies any dysphagia or odynophagia.

Denies any dysuria, hematuria, or any kind of syncopal episode. 

 

PHYSICAL EXAMINATION:

GENERAL:  The patient is awake, alert, appears to be stable, not in acute distress at

this point. 

VITAL SIGNS:  Afebrile currently with stable vital signs. 

HEAD, EYES, EARS, NOSE AND THROAT:  Normocephalic, atraumatic.  Sclerae anicteric. 

NECK:  Supple. 

HEART:  Regular. 

ABDOMEN:  Soft.  There is mild epigastric tenderness.  There is no rebound or mass. 

EXTREMITIES:  Demonstrates no clubbing or cyanosis.

LABORATORY VALUES:  As of today, liver enzymes appear to be normal.  BUN and creatinine

are normal.  Hemoglobin of 8.6, hematocrit 26.9.  CAT scan and ultrasound as mentioned

before. 

 

IMPRESSION:  

1. Abdominal pain, nausea, and vomiting.  The patient goes on with likely possibly

cholecystitis, status post cholecystectomy. 

2. Anemia.  Etiology is unclear.  There is no evidence of anemia at this point.

3. Diabetes.

 

RECOMMENDATION:  Continue postop care at this point, I would like to obtain iron study

and follow labs as well as clinically. 

 

 

 

 

______________________________

MD NICK Ness/PREM

D:  05/08/2019 19:07:25

T:  05/09/2019 00:20:49

Job #:  [id number]

 

cc:            Eleazar Gant MD

## 2019-05-09 NOTE — NUR
Spoke with Dr. Gr  and will want to see patient first before discharge and that patient 
will most likely discharge tomorrow.

## 2019-05-09 NOTE — NUR
Call to Dr. Calderón's office and to get clearance for discharge, Dr. Kapoor did rounds this 
morning and cleared patient for discharge.

## 2019-05-09 NOTE — NUR
Report given to Noris bagley at [facility]. Medications reconciled by MD and 
patient is ok for discharge per all disciplines, will transition to PO abx. Call ing 
ambulance at this time

## 2019-05-10 VITALS — SYSTOLIC BLOOD PRESSURE: 152 MMHG | DIASTOLIC BLOOD PRESSURE: 69 MMHG

## 2019-05-10 VITALS — SYSTOLIC BLOOD PRESSURE: 168 MMHG | DIASTOLIC BLOOD PRESSURE: 72 MMHG

## 2019-05-10 VITALS — DIASTOLIC BLOOD PRESSURE: 65 MMHG | SYSTOLIC BLOOD PRESSURE: 156 MMHG

## 2019-05-10 VITALS — SYSTOLIC BLOOD PRESSURE: 114 MMHG | DIASTOLIC BLOOD PRESSURE: 55 MMHG

## 2019-05-10 RX ADMIN — TAZOBACTAM SODIUM AND PIPERACILLIN SODIUM SCH GM: 375; 3 INJECTION, SOLUTION INTRAVENOUS at 09:31

## 2019-05-10 RX ADMIN — TAZOBACTAM SODIUM AND PIPERACILLIN SODIUM SCH GM: 375; 3 INJECTION, SOLUTION INTRAVENOUS at 02:09

## 2019-05-10 RX ADMIN — SODIUM CHLORIDE SCH MLS/HR: 9 INJECTION, SOLUTION INTRAVENOUS at 02:09

## 2019-05-10 RX ADMIN — INSULIN LISPRO SCH UNIT: 100 INJECTION, SOLUTION INTRAVENOUS; SUBCUTANEOUS at 07:30

## 2019-05-10 RX ADMIN — INSULIN LISPRO SCH UNIT: 100 INJECTION, SOLUTION INTRAVENOUS; SUBCUTANEOUS at 11:20

## 2019-05-10 NOTE — NUR
Received patient mid fowlers position, side rails upx2, call light within reach,  at 
bedside. AAOX3 to time, person, place, Romanian speaking. Respirations even and unlabored. O2 
1L NC. Denies sob. O2 removed. Will continue to monitor.

## 2019-05-10 NOTE — NUR
Visit made by the Spiritual Care Department Pastoral Visitor, Gibran Granados. PV provided 
pastoral presence, hospitality, and supportive listening. 

Pastoral Visitor informed pt/family of the scope of Chaplaincy Services and availability.



M[facility]



Spiritual Care Department

O: [phone]

Pager: [phone] ([phone])

## 2019-05-10 NOTE — NUR
HUYEN RECEIVED CALL FROM MARY IN SURGERY REGARDING PATIENT BEING RELEASED WITH NO HOME HEALTH. 
HUYEN INFORMED MARY THAT NO HOME HEALTH ORDER WAS WRITTEN PRIOR TO DISCHARGE. 



CM CALLED PATIENT DAUGHTER/ POA ARCADIO HE @ [phone]. CM LEFT MESSAGE WITH ARCADIO LUNA REGARDING HOME HEALTH CONCERNS. PATIENT DAUGHTER INFORMED THAT NO HOME HEALTH ORDER 
WAS PLACED PRIOR TO DISCHARGE SO WE ARE UNABLE TO SET UP HOME HEALTH SERVICES. PATIENT 
DAUGHTER ENCOURAGED TO FOLLOW UP WITH PRIMARY CARE PHYSICIAN OR UROLOGIST TO WRITE ORDER AND 
THEIR OFFICE IS ABLE TO SET UP HOME HEALTH FOR AG CATHETER CARE.

## 2019-05-10 NOTE — NUR
Left AC IV discontinued. No signs of infiltration noted. 2x2 gauze and tape placed. AAOx3 to 
time, person, place. Respirations even and unlabored. Taken via wheelchair to personal car. 
Discharge instructions, rx, and all personal belongings taken with patient.

## 2019-05-11 NOTE — DISCHARGE SUMMARY
CONSULTANTS:  

1. Dr. Steve Kapoor.

2. Dr. Jose Calderón.

 

PRIMARY CARE PHYSICIAN:  Oswaldo Samaniego MD

 

FINAL DIAGNOSES:  

1. Acute cholecystitis associated with abdominal pain, fever, nausea, and vomiting.

2. Status post laparoscopic cholecystectomy procedure done on May 8, 2019.

 

SUMMARY:  The patient is a 74-year-old female, who came to the hospital with acute

cholecystitis.  The patient was stable.  She had multiple workup done, found to be acute

cholecystitis.  The patient did have a fever of 101.4.  On admission, her WBC was 11.9.

The patient is stable.  Multiple imaging including MRCP, abdominal CT, and gallbladder

ultrasound.  The patient has no sign of obstructive stone.  The patient underwent

laparoscopic cholecystectomy.  The patient is doing much better.  She has tolerated all

her diet.  The patient will go home today.  Follow up as an outpatient.  She will be

discharged home with blood pressure medication, nifedipine XL 30 mg once a day, Levaquin

500 mg daily for seven days, Tylenol No. 3 p.r.n. for pain, Zofran ODT sublingual p.r.n.

for nausea and vomiting, Tessalon Perles 100 mg q.6 p.r.n. for cough. 

 

The patient instructed to follow up with Dr. Oswaldo Samaniego next week and Dr. Steve Kapoor

next week. 

 

For her diabetes, she may need adjustment of her medication.  We will discontinue the

metformin due to her chronic kidney failure and start the patient on Glucotrol XL 5 mg

daily. 

 

The patient is otherwise stable, discharged home.  Follow up as an outpatient with her

family physician. 

 

 

 

 

______________________________

MD CHEO Karmii/MODL

D:  05/10/2019 10:33:05

T:  05/11/2019 09:01:17

Job #:  [id number]

## 2019-05-13 NOTE — DISCHARGE SUMMARY
PRIMARY CARE PHYSICIAN:   __________ Aden.

 

CONSULTANTS:  Dr. Jose Calderón.

 

FINAL DIAGNOSES:  

1. Acute cholecystitis, status post laparoscopic cholecystectomy.

2. Dehydration, resolved.

3. Intractable nausea and vomiting, resolved.

4. Leukocytosis, resolved.

 

SUMMARY:  The patient is a 

 

 

DICTATION ENDS HERE

 

 

 

 

______________________________

MD CHEO Karimi/PREM

D:  05/10/2019 10:25:23

T:  05/11/2019 08:22:17

Job #:  [id number]

## 2021-12-27 ENCOUNTER — HOSPITAL ENCOUNTER (EMERGENCY)
Dept: HOSPITAL 88 - ER | Age: 76
Discharge: HOME | End: 2021-12-27
Payer: MEDICARE

## 2021-12-27 VITALS — WEIGHT: 115 LBS | HEIGHT: 60 IN | BODY MASS INDEX: 22.58 KG/M2

## 2021-12-27 DIAGNOSIS — N39.0: Primary | ICD-10-CM

## 2021-12-27 DIAGNOSIS — E11.9: ICD-10-CM

## 2021-12-27 LAB
ALBUMIN SERPL-MCNC: 3.5 G/DL (ref 3.5–5)
ALBUMIN/GLOB SERPL: 1 {RATIO} (ref 0.8–2)
ALP SERPL-CCNC: 106 IU/L (ref 40–150)
ALT SERPL-CCNC: 32 IU/L (ref 0–55)
ANION GAP SERPL CALC-SCNC: 15.9 MMOL/L (ref 8–16)
BACTERIA URNS QL MICRO: (no result) /HPF
BASOPHILS # BLD AUTO: 0.1 10*3/UL (ref 0–0.1)
BASOPHILS NFR BLD AUTO: 0.8 % (ref 0–1)
BUN SERPL-MCNC: 20 MG/DL (ref 7–26)
BUN/CREAT SERPL: 13 (ref 6–25)
CALCIUM SERPL-MCNC: 8.4 MG/DL (ref 8.4–10.2)
CHLORIDE SERPL-SCNC: 102 MMOL/L (ref 98–107)
CK MB SERPL-MCNC: 1.2 NG/ML (ref 0–5)
CK SERPL-CCNC: 79 IU/L (ref 29–168)
CLARITY UR: (no result)
CO2 SERPL-SCNC: 20 MMOL/L (ref 22–29)
COLOR UR: YELLOW
DEPRECATED APTT PLAS QN: 30.3 SECONDS (ref 23.8–35.5)
DEPRECATED INR PLAS: 0.91
DEPRECATED NEUTROPHILS # BLD AUTO: 6.2 10*3/UL (ref 2.1–6.9)
DEPRECATED RBC URNS MANUAL-ACNC: (no result) /HPF (ref 0–5)
EGFRCR SERPLBLD CKD-EPI 2021: 33 ML/MIN (ref 60–?)
EOSINOPHIL # BLD AUTO: 0.7 10*3/UL (ref 0–0.4)
EOSINOPHIL NFR BLD AUTO: 7.1 % (ref 0–6)
EPI CELLS URNS QL MICRO: (no result) /LPF
ERYTHROCYTE [DISTWIDTH] IN CORD BLOOD: 12.6 % (ref 11.7–14.4)
GLOBULIN PLAS-MCNC: 3.4 G/DL (ref 2.3–3.5)
GLUCOSE SERPLBLD-MCNC: 182 MG/DL (ref 74–118)
HCT VFR BLD AUTO: 36.7 % (ref 34.2–44.1)
HGB BLD-MCNC: 12.1 G/DL (ref 12–16)
KETONES UR QL STRIP.AUTO: (no result)
LEUKOCYTE ESTERASE UR QL STRIP.AUTO: (no result)
LYMPHOCYTES # BLD: 1.6 10*3/UL (ref 1–3.2)
LYMPHOCYTES NFR BLD AUTO: 17 % (ref 18–39.1)
MCH RBC QN AUTO: 31.2 PG (ref 28–32)
MCHC RBC AUTO-ENTMCNC: 33 G/DL (ref 31–35)
MCV RBC AUTO: 94.6 FL (ref 81–99)
MONOCYTES # BLD AUTO: 0.8 10*3/UL (ref 0.2–0.8)
MONOCYTES NFR BLD AUTO: 8.2 % (ref 4.4–11.3)
NEUTS SEG NFR BLD AUTO: 66.5 % (ref 38.7–80)
NITRITE UR QL STRIP.AUTO: POSITIVE
PLATELET # BLD AUTO: 237 X10E3/UL (ref 140–360)
POTASSIUM SERPL-SCNC: 3.9 MMOL/L (ref 3.5–5.1)
PROT UR QL STRIP.AUTO: (no result)
PROTHROMBIN TIME: 13 SECONDS (ref 11.9–14.5)
RBC # BLD AUTO: 3.88 X10E6/UL (ref 3.6–5.1)
SODIUM SERPL-SCNC: 134 MMOL/L (ref 136–145)
SP GR UR STRIP: 1.01 (ref 1.01–1.02)
UROBILINOGEN UR STRIP-MCNC: 0.2 MG/DL (ref 0.2–1)
WBC #/AREA URNS HPF: >50 /HPF (ref 0–5)

## 2021-12-27 PROCEDURE — 80053 COMPREHEN METABOLIC PANEL: CPT

## 2021-12-27 PROCEDURE — 99284 EMERGENCY DEPT VISIT MOD MDM: CPT

## 2021-12-27 PROCEDURE — 71045 X-RAY EXAM CHEST 1 VIEW: CPT

## 2021-12-27 PROCEDURE — 83690 ASSAY OF LIPASE: CPT

## 2021-12-27 PROCEDURE — 74176 CT ABD & PELVIS W/O CONTRAST: CPT

## 2021-12-27 PROCEDURE — 85610 PROTHROMBIN TIME: CPT

## 2021-12-27 PROCEDURE — 82553 CREATINE MB FRACTION: CPT

## 2021-12-27 PROCEDURE — 81001 URINALYSIS AUTO W/SCOPE: CPT

## 2021-12-27 PROCEDURE — 36415 COLL VENOUS BLD VENIPUNCTURE: CPT

## 2021-12-27 PROCEDURE — 84484 ASSAY OF TROPONIN QUANT: CPT

## 2021-12-27 PROCEDURE — 85025 COMPLETE CBC W/AUTO DIFF WBC: CPT

## 2021-12-27 PROCEDURE — 82550 ASSAY OF CK (CPK): CPT

## 2021-12-27 PROCEDURE — 85730 THROMBOPLASTIN TIME PARTIAL: CPT

## 2022-01-28 ENCOUNTER — HOSPITAL ENCOUNTER (INPATIENT)
Dept: HOSPITAL 88 - ER | Age: 77
LOS: 6 days | Discharge: HOME HEALTH | DRG: 271 | End: 2022-02-03
Attending: INTERNAL MEDICINE | Admitting: INTERNAL MEDICINE
Payer: MEDICARE

## 2022-01-28 VITALS — BODY MASS INDEX: 22.58 KG/M2 | HEIGHT: 60 IN | WEIGHT: 115 LBS

## 2022-01-28 DIAGNOSIS — N18.9: ICD-10-CM

## 2022-01-28 DIAGNOSIS — E11.628: ICD-10-CM

## 2022-01-28 DIAGNOSIS — Z79.84: ICD-10-CM

## 2022-01-28 DIAGNOSIS — L03.032: ICD-10-CM

## 2022-01-28 DIAGNOSIS — I70.262: ICD-10-CM

## 2022-01-28 DIAGNOSIS — L97.524: ICD-10-CM

## 2022-01-28 DIAGNOSIS — E11.69: ICD-10-CM

## 2022-01-28 DIAGNOSIS — I12.9: ICD-10-CM

## 2022-01-28 DIAGNOSIS — E11.621: ICD-10-CM

## 2022-01-28 DIAGNOSIS — Z20.822: ICD-10-CM

## 2022-01-28 DIAGNOSIS — D63.8: ICD-10-CM

## 2022-01-28 DIAGNOSIS — M86.8X7: ICD-10-CM

## 2022-01-28 DIAGNOSIS — H54.62: ICD-10-CM

## 2022-01-28 DIAGNOSIS — L97.529: ICD-10-CM

## 2022-01-28 DIAGNOSIS — Z83.3: ICD-10-CM

## 2022-01-28 DIAGNOSIS — E11.22: ICD-10-CM

## 2022-01-28 DIAGNOSIS — E78.5: ICD-10-CM

## 2022-01-28 DIAGNOSIS — I96: ICD-10-CM

## 2022-01-28 DIAGNOSIS — E11.52: Primary | ICD-10-CM

## 2022-01-28 LAB
ALBUMIN SERPL-MCNC: 3.7 G/DL (ref 3.5–5)
ALBUMIN/GLOB SERPL: 0.9 {RATIO} (ref 0.8–2)
ALP SERPL-CCNC: 161 IU/L (ref 40–150)
ALT SERPL-CCNC: 30 IU/L (ref 0–55)
ANION GAP SERPL CALC-SCNC: 16.8 MMOL/L (ref 8–16)
BASOPHILS # BLD AUTO: 0 10*3/UL (ref 0–0.1)
BASOPHILS NFR BLD AUTO: 0.4 % (ref 0–1)
BUN SERPL-MCNC: 16 MG/DL (ref 7–26)
BUN/CREAT SERPL: 13 (ref 6–25)
CALCIUM SERPL-MCNC: 9.7 MG/DL (ref 8.4–10.2)
CHLORIDE SERPL-SCNC: 103 MMOL/L (ref 98–107)
CO2 SERPL-SCNC: 25 MMOL/L (ref 22–29)
DEPRECATED NEUTROPHILS # BLD AUTO: 6.7 10*3/UL (ref 2.1–6.9)
EGFRCR SERPLBLD CKD-EPI 2021: 41 ML/MIN (ref 60–?)
EOSINOPHIL # BLD AUTO: 0.4 10*3/UL (ref 0–0.4)
EOSINOPHIL NFR BLD AUTO: 4.3 % (ref 0–6)
ERYTHROCYTE [DISTWIDTH] IN CORD BLOOD: 12.6 % (ref 11.7–14.4)
GLOBULIN PLAS-MCNC: 4.2 G/DL (ref 2.3–3.5)
GLUCOSE SERPLBLD-MCNC: 173 MG/DL (ref 74–118)
HCT VFR BLD AUTO: 35.7 % (ref 34.2–44.1)
HGB BLD-MCNC: 11.2 G/DL (ref 12–16)
LYMPHOCYTES # BLD: 1.2 10*3/UL (ref 1–3.2)
LYMPHOCYTES NFR BLD AUTO: 13.1 % (ref 18–39.1)
MCH RBC QN AUTO: 30.5 PG (ref 28–32)
MCHC RBC AUTO-ENTMCNC: 31.4 G/DL (ref 31–35)
MCV RBC AUTO: 97.3 FL (ref 81–99)
MONOCYTES # BLD AUTO: 0.7 10*3/UL (ref 0.2–0.8)
MONOCYTES NFR BLD AUTO: 7.4 % (ref 4.4–11.3)
NEUTS SEG NFR BLD AUTO: 74.5 % (ref 38.7–80)
PLATELET # BLD AUTO: 224 X10E3/UL (ref 140–360)
POTASSIUM SERPL-SCNC: 4.8 MMOL/L (ref 3.5–5.1)
RBC # BLD AUTO: 3.67 X10E6/UL (ref 3.6–5.1)
SODIUM SERPL-SCNC: 140 MMOL/L (ref 136–145)

## 2022-01-28 PROCEDURE — 36415 COLL VENOUS BLD VENIPUNCTURE: CPT

## 2022-01-28 PROCEDURE — 82607 VITAMIN B-12: CPT

## 2022-01-28 PROCEDURE — 83605 ASSAY OF LACTIC ACID: CPT

## 2022-01-28 PROCEDURE — 93005 ELECTROCARDIOGRAM TRACING: CPT

## 2022-01-28 PROCEDURE — 83036 HEMOGLOBIN GLYCOSYLATED A1C: CPT

## 2022-01-28 PROCEDURE — 75625 CONTRAST EXAM ABDOMINL AORTA: CPT

## 2022-01-28 PROCEDURE — 83735 ASSAY OF MAGNESIUM: CPT

## 2022-01-28 PROCEDURE — 97139 UNLISTED THERAPEUTIC PX: CPT

## 2022-01-28 PROCEDURE — 84443 ASSAY THYROID STIM HORMONE: CPT

## 2022-01-28 PROCEDURE — 87040 BLOOD CULTURE FOR BACTERIA: CPT

## 2022-01-28 PROCEDURE — 85730 THROMBOPLASTIN TIME PARTIAL: CPT

## 2022-01-28 PROCEDURE — 82746 ASSAY OF FOLIC ACID SERUM: CPT

## 2022-01-28 PROCEDURE — 83540 ASSAY OF IRON: CPT

## 2022-01-28 PROCEDURE — 80061 LIPID PANEL: CPT

## 2022-01-28 PROCEDURE — 85045 AUTOMATED RETICULOCYTE COUNT: CPT

## 2022-01-28 PROCEDURE — 84466 ASSAY OF TRANSFERRIN: CPT

## 2022-01-28 PROCEDURE — 88311 DECALCIFY TISSUE: CPT

## 2022-01-28 PROCEDURE — 93925 LOWER EXTREMITY STUDY: CPT

## 2022-01-28 PROCEDURE — 75716 ARTERY X-RAYS ARMS/LEGS: CPT

## 2022-01-28 PROCEDURE — 37229: CPT

## 2022-01-28 PROCEDURE — 80053 COMPREHEN METABOLIC PANEL: CPT

## 2022-01-28 PROCEDURE — 80202 ASSAY OF VANCOMYCIN: CPT

## 2022-01-28 PROCEDURE — 82270 OCCULT BLOOD FECES: CPT

## 2022-01-28 PROCEDURE — 85610 PROTHROMBIN TIME: CPT

## 2022-01-28 PROCEDURE — 37227: CPT

## 2022-01-28 PROCEDURE — 94799 UNLISTED PULMONARY SVC/PX: CPT

## 2022-01-28 PROCEDURE — 84100 ASSAY OF PHOSPHORUS: CPT

## 2022-01-28 PROCEDURE — 99153 MOD SED SAME PHYS/QHP EA: CPT

## 2022-01-28 PROCEDURE — 37228: CPT

## 2022-01-28 PROCEDURE — 87205 SMEAR GRAM STAIN: CPT

## 2022-01-28 PROCEDURE — 96372 THER/PROPH/DIAG INJ SC/IM: CPT

## 2022-01-28 PROCEDURE — 87071 CULTURE AEROBIC QUANT OTHER: CPT

## 2022-01-28 PROCEDURE — 37224: CPT

## 2022-01-28 PROCEDURE — 88305 TISSUE EXAM BY PATHOLOGIST: CPT

## 2022-01-28 PROCEDURE — 99284 EMERGENCY DEPT VISIT MOD MDM: CPT

## 2022-01-28 PROCEDURE — 88304 TISSUE EXAM BY PATHOLOGIST: CPT

## 2022-01-28 PROCEDURE — 99152 MOD SED SAME PHYS/QHP 5/>YRS: CPT

## 2022-01-28 PROCEDURE — 82728 ASSAY OF FERRITIN: CPT

## 2022-01-28 PROCEDURE — 87075 CULTR BACTERIA EXCEPT BLOOD: CPT

## 2022-01-28 PROCEDURE — 87186 SC STD MICRODIL/AGAR DIL: CPT

## 2022-01-28 PROCEDURE — 36247 INS CATH ABD/L-EXT ART 3RD: CPT

## 2022-01-28 PROCEDURE — 85025 COMPLETE CBC W/AUTO DIFF WBC: CPT

## 2022-01-28 PROCEDURE — 82948 REAGENT STRIP/BLOOD GLUCOSE: CPT

## 2022-01-28 PROCEDURE — 80048 BASIC METABOLIC PNL TOTAL CA: CPT

## 2022-01-28 RX ADMIN — SODIUM CHLORIDE SCH MLS/HR: 9 INJECTION, SOLUTION INTRAVENOUS at 23:15

## 2022-01-29 VITALS — DIASTOLIC BLOOD PRESSURE: 34 MMHG | SYSTOLIC BLOOD PRESSURE: 111 MMHG

## 2022-01-29 VITALS — SYSTOLIC BLOOD PRESSURE: 92 MMHG | DIASTOLIC BLOOD PRESSURE: 51 MMHG

## 2022-01-29 VITALS — DIASTOLIC BLOOD PRESSURE: 61 MMHG | SYSTOLIC BLOOD PRESSURE: 121 MMHG

## 2022-01-29 VITALS — DIASTOLIC BLOOD PRESSURE: 51 MMHG | SYSTOLIC BLOOD PRESSURE: 92 MMHG

## 2022-01-29 VITALS — DIASTOLIC BLOOD PRESSURE: 53 MMHG | SYSTOLIC BLOOD PRESSURE: 102 MMHG

## 2022-01-29 VITALS — SYSTOLIC BLOOD PRESSURE: 121 MMHG | DIASTOLIC BLOOD PRESSURE: 61 MMHG

## 2022-01-29 VITALS — DIASTOLIC BLOOD PRESSURE: 48 MMHG | SYSTOLIC BLOOD PRESSURE: 115 MMHG

## 2022-01-29 VITALS — DIASTOLIC BLOOD PRESSURE: 48 MMHG | SYSTOLIC BLOOD PRESSURE: 146 MMHG

## 2022-01-29 LAB
ALBUMIN SERPL-MCNC: 2.7 G/DL (ref 3.5–5)
ALBUMIN/GLOB SERPL: 0.9 {RATIO} (ref 0.8–2)
ALP SERPL-CCNC: 118 IU/L (ref 40–150)
ALT SERPL-CCNC: 21 IU/L (ref 0–55)
ANION GAP SERPL CALC-SCNC: 13.2 MMOL/L (ref 8–16)
BASOPHILS # BLD AUTO: 0 10*3/UL (ref 0–0.1)
BASOPHILS NFR BLD AUTO: 0.5 % (ref 0–1)
BUN SERPL-MCNC: 17 MG/DL (ref 7–26)
BUN/CREAT SERPL: 13 (ref 6–25)
CALCIUM SERPL-MCNC: 8.4 MG/DL (ref 8.4–10.2)
CHLORIDE SERPL-SCNC: 104 MMOL/L (ref 98–107)
CO2 SERPL-SCNC: 25 MMOL/L (ref 22–29)
DEPRECATED NEUTROPHILS # BLD AUTO: 6.5 10*3/UL (ref 2.1–6.9)
EGFRCR SERPLBLD CKD-EPI 2021: 39 ML/MIN (ref 60–?)
EOSINOPHIL # BLD AUTO: 0.4 10*3/UL (ref 0–0.4)
EOSINOPHIL NFR BLD AUTO: 4.5 % (ref 0–6)
ERYTHROCYTE [DISTWIDTH] IN CORD BLOOD: 12.8 % (ref 11.7–14.4)
GLOBULIN PLAS-MCNC: 3.1 G/DL (ref 2.3–3.5)
GLUCOSE SERPLBLD-MCNC: 288 MG/DL (ref 74–118)
HCT VFR BLD AUTO: 28.9 % (ref 34.2–44.1)
HGB BLD-MCNC: 9.3 G/DL (ref 12–16)
LYMPHOCYTES # BLD: 1.1 10*3/UL (ref 1–3.2)
LYMPHOCYTES NFR BLD AUTO: 12.2 % (ref 18–39.1)
MCH RBC QN AUTO: 30.8 PG (ref 28–32)
MCHC RBC AUTO-ENTMCNC: 32.2 G/DL (ref 31–35)
MCV RBC AUTO: 95.7 FL (ref 81–99)
MONOCYTES # BLD AUTO: 0.7 10*3/UL (ref 0.2–0.8)
MONOCYTES NFR BLD AUTO: 7.6 % (ref 4.4–11.3)
NEUTS SEG NFR BLD AUTO: 74.7 % (ref 38.7–80)
PLATELET # BLD AUTO: 194 X10E3/UL (ref 140–360)
POTASSIUM SERPL-SCNC: 4.2 MMOL/L (ref 3.5–5.1)
RBC # BLD AUTO: 3.02 X10E6/UL (ref 3.6–5.1)
SODIUM SERPL-SCNC: 138 MMOL/L (ref 136–145)

## 2022-01-29 RX ADMIN — DEXTROSE MONOHYDRATE SCH MLS/HR: 5 INJECTION INTRAVENOUS at 15:09

## 2022-01-29 RX ADMIN — INSULIN HUMAN SCH UNIT: 100 INJECTION, SOLUTION PARENTERAL at 08:00

## 2022-01-29 RX ADMIN — SODIUM CHLORIDE SCH MLS/HR: 9 INJECTION, SOLUTION INTRAVENOUS at 23:36

## 2022-01-29 RX ADMIN — DEXTROSE MONOHYDRATE SCH MLS/HR: 5 INJECTION INTRAVENOUS at 22:36

## 2022-01-29 RX ADMIN — INSULIN HUMAN SCH UNIT: 100 INJECTION, SOLUTION PARENTERAL at 20:17

## 2022-01-29 RX ADMIN — DEXTROSE MONOHYDRATE SCH MLS/HR: 5 INJECTION INTRAVENOUS at 06:05

## 2022-01-29 RX ADMIN — INSULIN HUMAN SCH UNIT: 100 INJECTION, SOLUTION PARENTERAL at 16:56

## 2022-01-29 RX ADMIN — INSULIN HUMAN SCH UNIT: 100 INJECTION, SOLUTION PARENTERAL at 12:42

## 2022-01-29 RX ADMIN — SODIUM CHLORIDE SCH MLS/HR: 9 INJECTION, SOLUTION INTRAVENOUS at 12:33

## 2022-01-30 VITALS — DIASTOLIC BLOOD PRESSURE: 49 MMHG | SYSTOLIC BLOOD PRESSURE: 103 MMHG

## 2022-01-30 VITALS — SYSTOLIC BLOOD PRESSURE: 110 MMHG | DIASTOLIC BLOOD PRESSURE: 37 MMHG

## 2022-01-30 VITALS — DIASTOLIC BLOOD PRESSURE: 52 MMHG | SYSTOLIC BLOOD PRESSURE: 96 MMHG

## 2022-01-30 VITALS — SYSTOLIC BLOOD PRESSURE: 113 MMHG | DIASTOLIC BLOOD PRESSURE: 48 MMHG

## 2022-01-30 VITALS — DIASTOLIC BLOOD PRESSURE: 53 MMHG | SYSTOLIC BLOOD PRESSURE: 100 MMHG

## 2022-01-30 VITALS — DIASTOLIC BLOOD PRESSURE: 48 MMHG | SYSTOLIC BLOOD PRESSURE: 113 MMHG

## 2022-01-30 VITALS — SYSTOLIC BLOOD PRESSURE: 113 MMHG | DIASTOLIC BLOOD PRESSURE: 62 MMHG

## 2022-01-30 RX ADMIN — INSULIN HUMAN SCH UNIT: 100 INJECTION, SOLUTION PARENTERAL at 16:16

## 2022-01-30 RX ADMIN — DEXTROSE MONOHYDRATE SCH MLS/HR: 5 INJECTION INTRAVENOUS at 05:05

## 2022-01-30 RX ADMIN — DOCUSATE SODIUM SCH MG: 100 TABLET, FILM COATED ORAL at 16:15

## 2022-01-30 RX ADMIN — Medication SCH MG: at 21:21

## 2022-01-30 RX ADMIN — FAMOTIDINE SCH MG: 20 TABLET, FILM COATED ORAL at 16:15

## 2022-01-30 RX ADMIN — INSULIN HUMAN SCH UNIT: 100 INJECTION, SOLUTION PARENTERAL at 07:30

## 2022-01-30 RX ADMIN — INSULIN HUMAN SCH UNIT: 100 INJECTION, SOLUTION PARENTERAL at 20:58

## 2022-01-30 RX ADMIN — DEXTROSE MONOHYDRATE SCH MLS/HR: 5 INJECTION INTRAVENOUS at 16:15

## 2022-01-30 RX ADMIN — DEXTROSE MONOHYDRATE SCH MLS/HR: 5 INJECTION INTRAVENOUS at 21:21

## 2022-01-30 RX ADMIN — SODIUM CHLORIDE SCH MLS/HR: 9 INJECTION, SOLUTION INTRAVENOUS at 11:41

## 2022-01-30 RX ADMIN — INSULIN HUMAN SCH UNIT: 100 INJECTION, SOLUTION PARENTERAL at 11:42

## 2022-01-31 VITALS — DIASTOLIC BLOOD PRESSURE: 60 MMHG | SYSTOLIC BLOOD PRESSURE: 132 MMHG

## 2022-01-31 VITALS — SYSTOLIC BLOOD PRESSURE: 146 MMHG | DIASTOLIC BLOOD PRESSURE: 64 MMHG

## 2022-01-31 VITALS — SYSTOLIC BLOOD PRESSURE: 124 MMHG | DIASTOLIC BLOOD PRESSURE: 57 MMHG

## 2022-01-31 VITALS — SYSTOLIC BLOOD PRESSURE: 148 MMHG | DIASTOLIC BLOOD PRESSURE: 65 MMHG

## 2022-01-31 VITALS — DIASTOLIC BLOOD PRESSURE: 57 MMHG | SYSTOLIC BLOOD PRESSURE: 137 MMHG

## 2022-01-31 VITALS — DIASTOLIC BLOOD PRESSURE: 63 MMHG | SYSTOLIC BLOOD PRESSURE: 145 MMHG

## 2022-01-31 VITALS — SYSTOLIC BLOOD PRESSURE: 148 MMHG | DIASTOLIC BLOOD PRESSURE: 66 MMHG

## 2022-01-31 VITALS — DIASTOLIC BLOOD PRESSURE: 64 MMHG | SYSTOLIC BLOOD PRESSURE: 149 MMHG

## 2022-01-31 VITALS — DIASTOLIC BLOOD PRESSURE: 57 MMHG | SYSTOLIC BLOOD PRESSURE: 124 MMHG

## 2022-01-31 VITALS — SYSTOLIC BLOOD PRESSURE: 148 MMHG | DIASTOLIC BLOOD PRESSURE: 68 MMHG

## 2022-01-31 VITALS — SYSTOLIC BLOOD PRESSURE: 132 MMHG | DIASTOLIC BLOOD PRESSURE: 65 MMHG

## 2022-01-31 VITALS — DIASTOLIC BLOOD PRESSURE: 68 MMHG | SYSTOLIC BLOOD PRESSURE: 148 MMHG

## 2022-01-31 LAB
ANION GAP SERPL CALC-SCNC: 13.9 MMOL/L (ref 8–16)
BASOPHILS # BLD AUTO: 0.1 10*3/UL (ref 0–0.1)
BASOPHILS NFR BLD AUTO: 1 % (ref 0–1)
BUN SERPL-MCNC: 15 MG/DL (ref 7–26)
BUN/CREAT SERPL: 12 (ref 6–25)
CALCIUM SERPL-MCNC: 8.3 MG/DL (ref 8.4–10.2)
CHLORIDE SERPL-SCNC: 108 MMOL/L (ref 98–107)
CHOLEST SERPL-MCNC: 109 MD/DL (ref 0–199)
CHOLEST/HDLC SERPL: 3.8 {RATIO} (ref 3–3.6)
CO2 SERPL-SCNC: 22 MMOL/L (ref 22–29)
DEPRECATED APTT PLAS QN: 38.4 SECONDS (ref 23.8–35.5)
DEPRECATED INR PLAS: 1
DEPRECATED NEUTROPHILS # BLD AUTO: 6.1 10*3/UL (ref 2.1–6.9)
DEPRECATED PHOSPHATE SERPL-MCNC: 3.3 MG/DL (ref 2.3–4.7)
EGFRCR SERPLBLD CKD-EPI 2021: 41 ML/MIN (ref 60–?)
EOSINOPHIL # BLD AUTO: 0.8 10*3/UL (ref 0–0.4)
EOSINOPHIL NFR BLD AUTO: 8.5 % (ref 0–6)
ERYTHROCYTE [DISTWIDTH] IN CORD BLOOD: 12.7 % (ref 11.7–14.4)
FERRITIN SERPL-MCNC: 83.95 NG/ML (ref 4.63–204)
GLUCOSE SERPLBLD-MCNC: 131 MG/DL (ref 74–118)
HCT VFR BLD AUTO: 28.2 % (ref 34.2–44.1)
HDLC SERPL-MSCNC: 29 MG/DL (ref 40–60)
HGB BLD-MCNC: 9 G/DL (ref 12–16)
IRON SATN MFR SERPL: 23 % (ref 15–50)
IRON SERPL-MCNC: 44 UG/DL (ref 50–170)
LDLC SERPL CALC-MCNC: 64 MG/DL (ref 60–130)
LYMPHOCYTES # BLD: 1.5 10*3/UL (ref 1–3.2)
LYMPHOCYTES NFR BLD AUTO: 15.6 % (ref 18–39.1)
MAGNESIUM SERPL-MCNC: 1.9 MG/DL (ref 1.3–2.1)
MCH RBC QN AUTO: 30.8 PG (ref 28–32)
MCHC RBC AUTO-ENTMCNC: 31.9 G/DL (ref 31–35)
MCV RBC AUTO: 96.6 FL (ref 81–99)
MONOCYTES # BLD AUTO: 0.8 10*3/UL (ref 0.2–0.8)
MONOCYTES NFR BLD AUTO: 8.4 % (ref 4.4–11.3)
NEUTS SEG NFR BLD AUTO: 66.2 % (ref 38.7–80)
PLATELET # BLD AUTO: 186 X10E3/UL (ref 140–360)
POTASSIUM SERPL-SCNC: 3.9 MMOL/L (ref 3.5–5.1)
PROTHROMBIN TIME: 13.9 SECONDS (ref 11.9–14.5)
RBC # BLD AUTO: 2.92 X10E6/UL (ref 3.6–5.1)
RETICS/RBC NFR AUTO: 1.1 % (ref 0.8–2.2)
SODIUM SERPL-SCNC: 140 MMOL/L (ref 136–145)
TIBC SERPL-MCNC: 192 UG/DL (ref 261–478)
TRANSFERRIN SERPL-MCNC: 137 MG/DL (ref 180–382)
TRIGL SERPL-MCNC: 79 MG/DL (ref 0–149)
TSH SERPL DL<=0.005 MIU/L-ACNC: 4.15 UIU/ML (ref 0.35–4.94)

## 2022-01-31 PROCEDURE — 047Q3Z1 DILATION OF LEFT ANTERIOR TIBIAL ARTERY USING DRUG-COATED BALLOON, PERCUTANEOUS APPROACH: ICD-10-PCS | Performed by: INTERNAL MEDICINE

## 2022-01-31 PROCEDURE — 04CL3ZZ EXTIRPATION OF MATTER FROM LEFT FEMORAL ARTERY, PERCUTANEOUS APPROACH: ICD-10-PCS | Performed by: INTERNAL MEDICINE

## 2022-01-31 PROCEDURE — 047L341 DILATION OF LEFT FEMORAL ARTERY WITH DRUG-ELUTING INTRALUMINAL DEVICE, USING DRUG-COATED BALLOON, PERCUTANEOUS APPROACH: ICD-10-PCS | Performed by: INTERNAL MEDICINE

## 2022-01-31 PROCEDURE — B41F1ZZ FLUOROSCOPY OF RIGHT LOWER EXTREMITY ARTERIES USING LOW OSMOLAR CONTRAST: ICD-10-PCS | Performed by: INTERNAL MEDICINE

## 2022-01-31 PROCEDURE — 04CQ3ZZ EXTIRPATION OF MATTER FROM LEFT ANTERIOR TIBIAL ARTERY, PERCUTANEOUS APPROACH: ICD-10-PCS | Performed by: INTERNAL MEDICINE

## 2022-01-31 PROCEDURE — 047N3Z1 DILATION OF LEFT POPLITEAL ARTERY USING DRUG-COATED BALLOON, PERCUTANEOUS APPROACH: ICD-10-PCS | Performed by: INTERNAL MEDICINE

## 2022-01-31 PROCEDURE — B4101ZZ FLUOROSCOPY OF ABDOMINAL AORTA USING LOW OSMOLAR CONTRAST: ICD-10-PCS | Performed by: INTERNAL MEDICINE

## 2022-01-31 PROCEDURE — B41G1ZZ FLUOROSCOPY OF LEFT LOWER EXTREMITY ARTERIES USING LOW OSMOLAR CONTRAST: ICD-10-PCS | Performed by: INTERNAL MEDICINE

## 2022-01-31 RX ADMIN — INSULIN HUMAN SCH UNIT: 100 INJECTION, SOLUTION PARENTERAL at 21:09

## 2022-01-31 RX ADMIN — INSULIN HUMAN SCH UNIT: 100 INJECTION, SOLUTION PARENTERAL at 11:30

## 2022-01-31 RX ADMIN — INSULIN HUMAN SCH UNIT: 100 INJECTION, SOLUTION PARENTERAL at 07:30

## 2022-01-31 RX ADMIN — DEXTROSE MONOHYDRATE SCH MLS/HR: 5 INJECTION INTRAVENOUS at 06:06

## 2022-01-31 RX ADMIN — DOCUSATE SODIUM SCH MG: 100 TABLET, FILM COATED ORAL at 08:24

## 2022-01-31 RX ADMIN — SODIUM CHLORIDE SCH MLS/HR: 9 INJECTION, SOLUTION INTRAVENOUS at 17:32

## 2022-01-31 RX ADMIN — FAMOTIDINE SCH MG: 20 TABLET, FILM COATED ORAL at 07:30

## 2022-01-31 RX ADMIN — SODIUM CHLORIDE SCH MLS/HR: 9 INJECTION, SOLUTION INTRAVENOUS at 14:10

## 2022-01-31 RX ADMIN — ASPIRIN 81 MG CHEWABLE TABLET SCH MG: 81 TABLET CHEWABLE at 09:00

## 2022-01-31 RX ADMIN — DOCUSATE SODIUM SCH MG: 100 TABLET, FILM COATED ORAL at 17:32

## 2022-01-31 RX ADMIN — Medication SCH MG: at 21:08

## 2022-01-31 RX ADMIN — SODIUM CHLORIDE SCH MLS/HR: 9 INJECTION, SOLUTION INTRAVENOUS at 00:55

## 2022-01-31 RX ADMIN — INSULIN HUMAN SCH UNIT: 100 INJECTION, SOLUTION PARENTERAL at 16:30

## 2022-01-31 RX ADMIN — SODIUM CHLORIDE SCH MLS/HR: 9 INJECTION, SOLUTION INTRAVENOUS at 10:30

## 2022-01-31 RX ADMIN — DEXTROSE MONOHYDRATE SCH MLS/HR: 5 INJECTION INTRAVENOUS at 21:10

## 2022-01-31 RX ADMIN — FAMOTIDINE SCH MG: 20 TABLET, FILM COATED ORAL at 17:32

## 2022-01-31 RX ADMIN — DEXTROSE MONOHYDRATE SCH MLS/HR: 5 INJECTION INTRAVENOUS at 15:31

## 2022-02-01 VITALS — DIASTOLIC BLOOD PRESSURE: 34 MMHG | SYSTOLIC BLOOD PRESSURE: 108 MMHG

## 2022-02-01 VITALS — SYSTOLIC BLOOD PRESSURE: 118 MMHG | DIASTOLIC BLOOD PRESSURE: 52 MMHG

## 2022-02-01 VITALS — SYSTOLIC BLOOD PRESSURE: 98 MMHG | DIASTOLIC BLOOD PRESSURE: 49 MMHG

## 2022-02-01 VITALS — SYSTOLIC BLOOD PRESSURE: 103 MMHG | DIASTOLIC BLOOD PRESSURE: 42 MMHG

## 2022-02-01 VITALS — DIASTOLIC BLOOD PRESSURE: 49 MMHG | SYSTOLIC BLOOD PRESSURE: 112 MMHG

## 2022-02-01 VITALS — SYSTOLIC BLOOD PRESSURE: 112 MMHG | DIASTOLIC BLOOD PRESSURE: 49 MMHG

## 2022-02-01 VITALS — DIASTOLIC BLOOD PRESSURE: 50 MMHG | SYSTOLIC BLOOD PRESSURE: 109 MMHG

## 2022-02-01 LAB
ANION GAP SERPL CALC-SCNC: 14.7 MMOL/L (ref 8–16)
BASOPHILS # BLD AUTO: 0.1 10*3/UL (ref 0–0.1)
BASOPHILS NFR BLD AUTO: 0.6 % (ref 0–1)
BUN SERPL-MCNC: 16 MG/DL (ref 7–26)
BUN/CREAT SERPL: 14 (ref 6–25)
CALCIUM SERPL-MCNC: 8.1 MG/DL (ref 8.4–10.2)
CHLORIDE SERPL-SCNC: 105 MMOL/L (ref 98–107)
CO2 SERPL-SCNC: 20 MMOL/L (ref 22–29)
DEPRECATED NEUTROPHILS # BLD AUTO: 6.8 10*3/UL (ref 2.1–6.9)
EGFRCR SERPLBLD CKD-EPI 2021: 46 ML/MIN (ref 60–?)
EOSINOPHIL # BLD AUTO: 0.5 10*3/UL (ref 0–0.4)
EOSINOPHIL NFR BLD AUTO: 5.7 % (ref 0–6)
ERYTHROCYTE [DISTWIDTH] IN CORD BLOOD: 12.5 % (ref 11.7–14.4)
GLUCOSE SERPLBLD-MCNC: 132 MG/DL (ref 74–118)
HCT VFR BLD AUTO: 26.5 % (ref 34.2–44.1)
HGB BLD-MCNC: 8.6 G/DL (ref 12–16)
LYMPHOCYTES # BLD: 0.9 10*3/UL (ref 1–3.2)
LYMPHOCYTES NFR BLD AUTO: 9.7 % (ref 18–39.1)
MCH RBC QN AUTO: 30.9 PG (ref 28–32)
MCHC RBC AUTO-ENTMCNC: 32.5 G/DL (ref 31–35)
MCV RBC AUTO: 95.3 FL (ref 81–99)
MONOCYTES # BLD AUTO: 0.8 10*3/UL (ref 0.2–0.8)
MONOCYTES NFR BLD AUTO: 8.4 % (ref 4.4–11.3)
NEUTS SEG NFR BLD AUTO: 75.3 % (ref 38.7–80)
PLATELET # BLD AUTO: 176 X10E3/UL (ref 140–360)
POTASSIUM SERPL-SCNC: 3.7 MMOL/L (ref 3.5–5.1)
RBC # BLD AUTO: 2.78 X10E6/UL (ref 3.6–5.1)
SODIUM SERPL-SCNC: 136 MMOL/L (ref 136–145)

## 2022-02-01 RX ADMIN — FAMOTIDINE SCH MG: 20 TABLET, FILM COATED ORAL at 17:35

## 2022-02-01 RX ADMIN — ASPIRIN 81 MG CHEWABLE TABLET SCH MG: 81 TABLET CHEWABLE at 08:38

## 2022-02-01 RX ADMIN — CLOPIDOGREL BISULFATE SCH MG: 75 TABLET, FILM COATED ORAL at 08:39

## 2022-02-01 RX ADMIN — SODIUM CHLORIDE SCH MLS/HR: 9 INJECTION, SOLUTION INTRAVENOUS at 17:35

## 2022-02-01 RX ADMIN — FAMOTIDINE SCH MG: 20 TABLET, FILM COATED ORAL at 08:38

## 2022-02-01 RX ADMIN — DEXTROSE MONOHYDRATE SCH MLS/HR: 5 INJECTION INTRAVENOUS at 21:44

## 2022-02-01 RX ADMIN — INSULIN HUMAN SCH UNIT: 100 INJECTION, SOLUTION PARENTERAL at 12:39

## 2022-02-01 RX ADMIN — SODIUM CHLORIDE SCH MLS/HR: 9 INJECTION, SOLUTION INTRAVENOUS at 06:00

## 2022-02-01 RX ADMIN — DEXTROSE MONOHYDRATE SCH MLS/HR: 5 INJECTION INTRAVENOUS at 13:26

## 2022-02-01 RX ADMIN — Medication SCH MG: at 21:42

## 2022-02-01 RX ADMIN — INSULIN HUMAN SCH UNIT: 100 INJECTION, SOLUTION PARENTERAL at 17:34

## 2022-02-01 RX ADMIN — DEXTROSE MONOHYDRATE SCH MLS/HR: 5 INJECTION INTRAVENOUS at 06:11

## 2022-02-01 RX ADMIN — SODIUM CHLORIDE SCH MLS/HR: 9 INJECTION, SOLUTION INTRAVENOUS at 01:20

## 2022-02-01 RX ADMIN — DOCUSATE SODIUM SCH MG: 100 TABLET, FILM COATED ORAL at 17:35

## 2022-02-01 RX ADMIN — SODIUM CHLORIDE SCH MLS/HR: 9 INJECTION, SOLUTION INTRAVENOUS at 11:58

## 2022-02-01 RX ADMIN — DOCUSATE SODIUM SCH MG: 100 TABLET, FILM COATED ORAL at 08:38

## 2022-02-01 RX ADMIN — INSULIN HUMAN SCH UNIT: 100 INJECTION, SOLUTION PARENTERAL at 21:43

## 2022-02-01 RX ADMIN — INSULIN HUMAN SCH UNIT: 100 INJECTION, SOLUTION PARENTERAL at 07:30

## 2022-02-02 VITALS — SYSTOLIC BLOOD PRESSURE: 100 MMHG | DIASTOLIC BLOOD PRESSURE: 38 MMHG

## 2022-02-02 VITALS — SYSTOLIC BLOOD PRESSURE: 107 MMHG | DIASTOLIC BLOOD PRESSURE: 56 MMHG

## 2022-02-02 VITALS — DIASTOLIC BLOOD PRESSURE: 55 MMHG | SYSTOLIC BLOOD PRESSURE: 118 MMHG

## 2022-02-02 VITALS — DIASTOLIC BLOOD PRESSURE: 38 MMHG | SYSTOLIC BLOOD PRESSURE: 100 MMHG

## 2022-02-02 VITALS — SYSTOLIC BLOOD PRESSURE: 112 MMHG | DIASTOLIC BLOOD PRESSURE: 56 MMHG

## 2022-02-02 VITALS — DIASTOLIC BLOOD PRESSURE: 54 MMHG | SYSTOLIC BLOOD PRESSURE: 131 MMHG

## 2022-02-02 VITALS — SYSTOLIC BLOOD PRESSURE: 118 MMHG | DIASTOLIC BLOOD PRESSURE: 55 MMHG

## 2022-02-02 LAB
ANION GAP SERPL CALC-SCNC: 11.7 MMOL/L (ref 8–16)
BASOPHILS # BLD AUTO: 0.1 10*3/UL (ref 0–0.1)
BASOPHILS NFR BLD AUTO: 0.7 % (ref 0–1)
BUN SERPL-MCNC: 16 MG/DL (ref 7–26)
BUN/CREAT SERPL: 13 (ref 6–25)
CALCIUM SERPL-MCNC: 7.8 MG/DL (ref 8.4–10.2)
CHLORIDE SERPL-SCNC: 107 MMOL/L (ref 98–107)
CO2 SERPL-SCNC: 22 MMOL/L (ref 22–29)
DEPRECATED INR PLAS: 1.06
DEPRECATED NEUTROPHILS # BLD AUTO: 6.2 10*3/UL (ref 2.1–6.9)
DEPRECATED PHOSPHATE SERPL-MCNC: 2.4 MG/DL (ref 2.3–4.7)
EGFRCR SERPLBLD CKD-EPI 2021: 42 ML/MIN (ref 60–?)
EOSINOPHIL # BLD AUTO: 0.6 10*3/UL (ref 0–0.4)
EOSINOPHIL NFR BLD AUTO: 6.7 % (ref 0–6)
ERYTHROCYTE [DISTWIDTH] IN CORD BLOOD: 12.8 % (ref 11.7–14.4)
GLUCOSE SERPLBLD-MCNC: 124 MG/DL (ref 74–118)
HCT VFR BLD AUTO: 24.7 % (ref 34.2–44.1)
HGB BLD-MCNC: 8 G/DL (ref 12–16)
LYMPHOCYTES # BLD: 1.3 10*3/UL (ref 1–3.2)
LYMPHOCYTES NFR BLD AUTO: 13.8 % (ref 18–39.1)
MAGNESIUM SERPL-MCNC: 1.9 MG/DL (ref 1.3–2.1)
MCH RBC QN AUTO: 30.8 PG (ref 28–32)
MCHC RBC AUTO-ENTMCNC: 32.4 G/DL (ref 31–35)
MCV RBC AUTO: 95 FL (ref 81–99)
MONOCYTES # BLD AUTO: 1 10*3/UL (ref 0.2–0.8)
MONOCYTES NFR BLD AUTO: 11.1 % (ref 4.4–11.3)
NEUTS SEG NFR BLD AUTO: 67.3 % (ref 38.7–80)
PLATELET # BLD AUTO: 165 X10E3/UL (ref 140–360)
POTASSIUM SERPL-SCNC: 3.7 MMOL/L (ref 3.5–5.1)
PROTHROMBIN TIME: 14.6 SECONDS (ref 11.9–14.5)
RBC # BLD AUTO: 2.6 X10E6/UL (ref 3.6–5.1)
SODIUM SERPL-SCNC: 137 MMOL/L (ref 136–145)

## 2022-02-02 PROCEDURE — 0Y6S0Z0 DETACHMENT AT LEFT 2ND TOE, COMPLETE, OPEN APPROACH: ICD-10-PCS | Performed by: PODIATRIST

## 2022-02-02 RX ADMIN — SODIUM CHLORIDE SCH MLS/HR: 9 INJECTION, SOLUTION INTRAVENOUS at 07:30

## 2022-02-02 RX ADMIN — ASPIRIN 81 MG CHEWABLE TABLET SCH MG: 81 TABLET CHEWABLE at 14:46

## 2022-02-02 RX ADMIN — FAMOTIDINE SCH MG: 20 TABLET, FILM COATED ORAL at 07:30

## 2022-02-02 RX ADMIN — CLOPIDOGREL BISULFATE SCH MG: 75 TABLET, FILM COATED ORAL at 14:46

## 2022-02-02 RX ADMIN — SODIUM CHLORIDE SCH MLS/HR: 9 INJECTION, SOLUTION INTRAVENOUS at 17:03

## 2022-02-02 RX ADMIN — DEXTROSE MONOHYDRATE SCH MLS/HR: 5 INJECTION INTRAVENOUS at 06:00

## 2022-02-02 RX ADMIN — SODIUM CHLORIDE SCH MLS/HR: 9 INJECTION, SOLUTION INTRAVENOUS at 15:50

## 2022-02-02 RX ADMIN — FAMOTIDINE SCH MG: 20 TABLET, FILM COATED ORAL at 17:03

## 2022-02-02 RX ADMIN — INSULIN HUMAN SCH UNIT: 100 INJECTION, SOLUTION PARENTERAL at 21:23

## 2022-02-02 RX ADMIN — INSULIN HUMAN SCH UNIT: 100 INJECTION, SOLUTION PARENTERAL at 07:30

## 2022-02-02 RX ADMIN — SODIUM CHLORIDE SCH MLS/HR: 9 INJECTION, SOLUTION INTRAVENOUS at 03:04

## 2022-02-02 RX ADMIN — INSULIN HUMAN SCH UNIT: 100 INJECTION, SOLUTION PARENTERAL at 11:30

## 2022-02-02 RX ADMIN — Medication SCH MG: at 21:24

## 2022-02-02 RX ADMIN — DEXTROSE MONOHYDRATE SCH MLS/HR: 5 INJECTION INTRAVENOUS at 21:24

## 2022-02-02 RX ADMIN — INSULIN HUMAN SCH UNIT: 100 INJECTION, SOLUTION PARENTERAL at 16:30

## 2022-02-02 RX ADMIN — DOCUSATE SODIUM SCH MG: 100 TABLET, FILM COATED ORAL at 09:00

## 2022-02-02 RX ADMIN — DEXTROSE MONOHYDRATE SCH MLS/HR: 5 INJECTION INTRAVENOUS at 14:19

## 2022-02-02 RX ADMIN — DOCUSATE SODIUM SCH MG: 100 TABLET, FILM COATED ORAL at 17:00

## 2022-02-02 RX ADMIN — DOCUSATE SODIUM SCH MG: 100 TABLET, FILM COATED ORAL at 17:03

## 2022-02-03 VITALS — SYSTOLIC BLOOD PRESSURE: 105 MMHG | DIASTOLIC BLOOD PRESSURE: 89 MMHG

## 2022-02-03 VITALS — SYSTOLIC BLOOD PRESSURE: 124 MMHG | DIASTOLIC BLOOD PRESSURE: 57 MMHG

## 2022-02-03 VITALS — DIASTOLIC BLOOD PRESSURE: 57 MMHG | SYSTOLIC BLOOD PRESSURE: 124 MMHG

## 2022-02-03 LAB
ALBUMIN SERPL-MCNC: 2.2 G/DL (ref 3.5–5)
ALBUMIN/GLOB SERPL: 0.6 {RATIO} (ref 0.8–2)
ALP SERPL-CCNC: 102 IU/L (ref 40–150)
ALT SERPL-CCNC: 26 IU/L (ref 0–55)
ANION GAP SERPL CALC-SCNC: 11.7 MMOL/L (ref 8–16)
BASOPHILS # BLD AUTO: 0.1 10*3/UL (ref 0–0.1)
BASOPHILS NFR BLD AUTO: 0.5 % (ref 0–1)
BUN SERPL-MCNC: 20 MG/DL (ref 7–26)
BUN/CREAT SERPL: 17 (ref 6–25)
CALCIUM SERPL-MCNC: 7.7 MG/DL (ref 8.4–10.2)
CHLORIDE SERPL-SCNC: 109 MMOL/L (ref 98–107)
CO2 SERPL-SCNC: 19 MMOL/L (ref 22–29)
DEPRECATED NEUTROPHILS # BLD AUTO: 6.6 10*3/UL (ref 2.1–6.9)
EGFRCR SERPLBLD CKD-EPI 2021: 45 ML/MIN (ref 60–?)
EOSINOPHIL # BLD AUTO: 0.7 10*3/UL (ref 0–0.4)
EOSINOPHIL NFR BLD AUTO: 7.2 % (ref 0–6)
ERYTHROCYTE [DISTWIDTH] IN CORD BLOOD: 12.8 % (ref 11.7–14.4)
GLOBULIN PLAS-MCNC: 3.6 G/DL (ref 2.3–3.5)
GLUCOSE SERPLBLD-MCNC: 167 MG/DL (ref 74–118)
HCT VFR BLD AUTO: 26.1 % (ref 34.2–44.1)
HGB BLD-MCNC: 8.2 G/DL (ref 12–16)
LYMPHOCYTES # BLD: 0.9 10*3/UL (ref 1–3.2)
LYMPHOCYTES NFR BLD AUTO: 10.2 % (ref 18–39.1)
MCH RBC QN AUTO: 30.9 PG (ref 28–32)
MCHC RBC AUTO-ENTMCNC: 31.4 G/DL (ref 31–35)
MCV RBC AUTO: 98.5 FL (ref 81–99)
MONOCYTES # BLD AUTO: 0.9 10*3/UL (ref 0.2–0.8)
MONOCYTES NFR BLD AUTO: 9.4 % (ref 4.4–11.3)
NEUTS SEG NFR BLD AUTO: 72.2 % (ref 38.7–80)
PLATELET # BLD AUTO: 162 X10E3/UL (ref 140–360)
POTASSIUM SERPL-SCNC: 4.7 MMOL/L (ref 3.5–5.1)
RBC # BLD AUTO: 2.65 X10E6/UL (ref 3.6–5.1)
SODIUM SERPL-SCNC: 135 MMOL/L (ref 136–145)

## 2022-02-03 RX ADMIN — DEXTROSE MONOHYDRATE SCH MLS/HR: 5 INJECTION INTRAVENOUS at 06:29

## 2022-02-03 RX ADMIN — FAMOTIDINE SCH MG: 20 TABLET, FILM COATED ORAL at 10:29

## 2022-02-03 RX ADMIN — INSULIN HUMAN SCH UNIT: 100 INJECTION, SOLUTION PARENTERAL at 10:10

## 2022-02-03 RX ADMIN — DOCUSATE SODIUM SCH MG: 100 TABLET, FILM COATED ORAL at 09:52

## 2022-02-03 RX ADMIN — SODIUM CHLORIDE SCH MLS/HR: 9 INJECTION, SOLUTION INTRAVENOUS at 06:29

## 2022-02-03 RX ADMIN — ASPIRIN 81 MG CHEWABLE TABLET SCH MG: 81 TABLET CHEWABLE at 09:52

## 2022-02-03 RX ADMIN — CLOPIDOGREL BISULFATE SCH MG: 75 TABLET, FILM COATED ORAL at 09:53

## 2023-01-31 ENCOUNTER — HOSPITAL ENCOUNTER (INPATIENT)
Dept: HOSPITAL 88 - ER | Age: 78
LOS: 7 days | Discharge: HOME | DRG: 253 | End: 2023-02-07
Attending: INTERNAL MEDICINE | Admitting: INTERNAL MEDICINE
Payer: MEDICARE

## 2023-01-31 VITALS — SYSTOLIC BLOOD PRESSURE: 146 MMHG | DIASTOLIC BLOOD PRESSURE: 69 MMHG

## 2023-01-31 VITALS — HEIGHT: 62 IN | WEIGHT: 115 LBS | BODY MASS INDEX: 21.16 KG/M2

## 2023-01-31 DIAGNOSIS — E11.52: Primary | ICD-10-CM

## 2023-01-31 DIAGNOSIS — E11.42: ICD-10-CM

## 2023-01-31 DIAGNOSIS — Z79.2: ICD-10-CM

## 2023-01-31 DIAGNOSIS — H54.62: ICD-10-CM

## 2023-01-31 DIAGNOSIS — R53.81: ICD-10-CM

## 2023-01-31 DIAGNOSIS — Z98.890: ICD-10-CM

## 2023-01-31 DIAGNOSIS — I12.9: ICD-10-CM

## 2023-01-31 DIAGNOSIS — Z79.02: ICD-10-CM

## 2023-01-31 DIAGNOSIS — Z79.82: ICD-10-CM

## 2023-01-31 DIAGNOSIS — N18.9: ICD-10-CM

## 2023-01-31 DIAGNOSIS — Z83.3: ICD-10-CM

## 2023-01-31 DIAGNOSIS — E11.51: ICD-10-CM

## 2023-01-31 DIAGNOSIS — D63.1: ICD-10-CM

## 2023-01-31 DIAGNOSIS — E11.621: ICD-10-CM

## 2023-01-31 DIAGNOSIS — M86.9: ICD-10-CM

## 2023-01-31 DIAGNOSIS — Z79.4: ICD-10-CM

## 2023-01-31 DIAGNOSIS — Z79.899: ICD-10-CM

## 2023-01-31 DIAGNOSIS — L03.116: ICD-10-CM

## 2023-01-31 DIAGNOSIS — E11.22: ICD-10-CM

## 2023-01-31 DIAGNOSIS — I96: ICD-10-CM

## 2023-01-31 DIAGNOSIS — L97.523: ICD-10-CM

## 2023-01-31 DIAGNOSIS — Z89.422: ICD-10-CM

## 2023-01-31 LAB
ALBUMIN SERPL-MCNC: 3.1 G/DL (ref 3.5–5)
ALBUMIN/GLOB SERPL: 0.7 {RATIO} (ref 0.8–2)
ALP SERPL-CCNC: 98 IU/L (ref 40–150)
ALT SERPL-CCNC: 10 IU/L (ref 0–55)
ANION GAP SERPL CALC-SCNC: 14 MMOL/L (ref 8–16)
BASOPHILS # BLD AUTO: 0.1 10*3/UL (ref 0–0.1)
BASOPHILS NFR BLD AUTO: 0.7 % (ref 0–1)
BUN SERPL-MCNC: 17 MG/DL (ref 7–26)
BUN/CREAT SERPL: 14 (ref 6–25)
CALCIUM SERPL-MCNC: 9 MG/DL (ref 8.4–10.2)
CHLORIDE SERPL-SCNC: 101 MMOL/L (ref 98–107)
CO2 SERPL-SCNC: 25 MMOL/L (ref 22–29)
DEPRECATED NEUTROPHILS # BLD AUTO: 7.5 10*3/UL (ref 2.1–6.9)
EOSINOPHIL # BLD AUTO: 0.3 10*3/UL (ref 0–0.4)
EOSINOPHIL NFR BLD AUTO: 3.4 % (ref 0–6)
ERYTHROCYTE [DISTWIDTH] IN CORD BLOOD: 11.9 % (ref 11.7–14.4)
GLOBULIN PLAS-MCNC: 4.2 G/DL (ref 2.3–3.5)
GLUCOSE SERPLBLD-MCNC: 170 MG/DL (ref 74–118)
HCT VFR BLD AUTO: 31.6 % (ref 34.2–44.1)
HGB BLD-MCNC: 9.5 G/DL (ref 12–16)
LYMPHOCYTES # BLD: 1.1 10*3/UL (ref 1–3.2)
LYMPHOCYTES NFR BLD AUTO: 11.2 % (ref 18–39.1)
MCH RBC QN AUTO: 30.3 PG (ref 28–32)
MCHC RBC AUTO-ENTMCNC: 30.1 G/DL (ref 31–35)
MCV RBC AUTO: 100.6 FL (ref 81–99)
MONOCYTES # BLD AUTO: 0.6 10*3/UL (ref 0.2–0.8)
MONOCYTES NFR BLD AUTO: 6.7 % (ref 4.4–11.3)
NEUTS SEG NFR BLD AUTO: 77.6 % (ref 38.7–80)
PLATELET # BLD AUTO: 316 X10E3/UL (ref 140–360)
POTASSIUM SERPL-SCNC: 4 MMOL/L (ref 3.5–5.1)
RBC # BLD AUTO: 3.14 X10E6/UL (ref 3.6–5.1)
SODIUM SERPL-SCNC: 136 MMOL/L (ref 136–145)

## 2023-01-31 PROCEDURE — 80053 COMPREHEN METABOLIC PANEL: CPT

## 2023-01-31 PROCEDURE — 84443 ASSAY THYROID STIM HORMONE: CPT

## 2023-01-31 PROCEDURE — 99153 MOD SED SAME PHYS/QHP EA: CPT

## 2023-01-31 PROCEDURE — 37228: CPT

## 2023-01-31 PROCEDURE — 85025 COMPLETE CBC W/AUTO DIFF WBC: CPT

## 2023-01-31 PROCEDURE — 83605 ASSAY OF LACTIC ACID: CPT

## 2023-01-31 PROCEDURE — 93925 LOWER EXTREMITY STUDY: CPT

## 2023-01-31 PROCEDURE — 37224: CPT

## 2023-01-31 PROCEDURE — 80048 BASIC METABOLIC PNL TOTAL CA: CPT

## 2023-01-31 PROCEDURE — 75716 ARTERY X-RAYS ARMS/LEGS: CPT

## 2023-01-31 PROCEDURE — 99152 MOD SED SAME PHYS/QHP 5/>YRS: CPT

## 2023-01-31 PROCEDURE — 87040 BLOOD CULTURE FOR BACTERIA: CPT

## 2023-01-31 PROCEDURE — 93306 TTE W/DOPPLER COMPLETE: CPT

## 2023-01-31 PROCEDURE — 99284 EMERGENCY DEPT VISIT MOD MDM: CPT

## 2023-01-31 PROCEDURE — 82948 REAGENT STRIP/BLOOD GLUCOSE: CPT

## 2023-01-31 PROCEDURE — 99252 IP/OBS CONSLTJ NEW/EST SF 35: CPT

## 2023-01-31 PROCEDURE — 75625 CONTRAST EXAM ABDOMINL AORTA: CPT

## 2023-01-31 PROCEDURE — 36415 COLL VENOUS BLD VENIPUNCTURE: CPT

## 2023-01-31 PROCEDURE — 83036 HEMOGLOBIN GLYCOSYLATED A1C: CPT

## 2023-02-01 VITALS — DIASTOLIC BLOOD PRESSURE: 50 MMHG | SYSTOLIC BLOOD PRESSURE: 126 MMHG

## 2023-02-01 VITALS — DIASTOLIC BLOOD PRESSURE: 43 MMHG | SYSTOLIC BLOOD PRESSURE: 127 MMHG

## 2023-02-01 VITALS — DIASTOLIC BLOOD PRESSURE: 57 MMHG | SYSTOLIC BLOOD PRESSURE: 127 MMHG

## 2023-02-01 VITALS — DIASTOLIC BLOOD PRESSURE: 54 MMHG | SYSTOLIC BLOOD PRESSURE: 99 MMHG

## 2023-02-01 VITALS — SYSTOLIC BLOOD PRESSURE: 127 MMHG | DIASTOLIC BLOOD PRESSURE: 43 MMHG

## 2023-02-01 VITALS — SYSTOLIC BLOOD PRESSURE: 102 MMHG | DIASTOLIC BLOOD PRESSURE: 48 MMHG

## 2023-02-01 RX ADMIN — INSULIN HUMAN SCH UNIT: 100 INJECTION, SOLUTION PARENTERAL at 11:30

## 2023-02-01 RX ADMIN — INSULIN HUMAN SCH UNIT: 100 INJECTION, SOLUTION PARENTERAL at 16:23

## 2023-02-01 RX ADMIN — BIMATOPROST SCH ML: 0.3 SOLUTION/ DROPS OPHTHALMIC at 20:38

## 2023-02-01 RX ADMIN — INSULIN HUMAN SCH UNIT: 100 INJECTION, SOLUTION PARENTERAL at 20:40

## 2023-02-02 VITALS — DIASTOLIC BLOOD PRESSURE: 52 MMHG | SYSTOLIC BLOOD PRESSURE: 109 MMHG

## 2023-02-02 VITALS — SYSTOLIC BLOOD PRESSURE: 125 MMHG | DIASTOLIC BLOOD PRESSURE: 51 MMHG

## 2023-02-02 VITALS — DIASTOLIC BLOOD PRESSURE: 45 MMHG | SYSTOLIC BLOOD PRESSURE: 115 MMHG

## 2023-02-02 VITALS — DIASTOLIC BLOOD PRESSURE: 53 MMHG | SYSTOLIC BLOOD PRESSURE: 140 MMHG

## 2023-02-02 VITALS — SYSTOLIC BLOOD PRESSURE: 140 MMHG | DIASTOLIC BLOOD PRESSURE: 53 MMHG

## 2023-02-02 VITALS — SYSTOLIC BLOOD PRESSURE: 120 MMHG | DIASTOLIC BLOOD PRESSURE: 57 MMHG

## 2023-02-02 VITALS — SYSTOLIC BLOOD PRESSURE: 109 MMHG | DIASTOLIC BLOOD PRESSURE: 52 MMHG

## 2023-02-02 VITALS — DIASTOLIC BLOOD PRESSURE: 51 MMHG | SYSTOLIC BLOOD PRESSURE: 129 MMHG

## 2023-02-02 RX ADMIN — INSULIN HUMAN SCH UNIT: 100 INJECTION, SOLUTION PARENTERAL at 08:46

## 2023-02-02 RX ADMIN — BIMATOPROST SCH ML: 0.3 SOLUTION/ DROPS OPHTHALMIC at 21:43

## 2023-02-02 RX ADMIN — INSULIN HUMAN SCH UNIT: 100 INJECTION, SOLUTION PARENTERAL at 16:30

## 2023-02-02 RX ADMIN — INSULIN HUMAN SCH UNIT: 100 INJECTION, SOLUTION PARENTERAL at 21:34

## 2023-02-02 RX ADMIN — INSULIN GLARGINE SCH UNITS: 100 INJECTION, SOLUTION SUBCUTANEOUS at 21:43

## 2023-02-02 RX ADMIN — ASPIRIN 81 MG CHEWABLE TABLET SCH MG: 81 TABLET CHEWABLE at 08:43

## 2023-02-02 RX ADMIN — INSULIN HUMAN SCH UNIT: 100 INJECTION, SOLUTION PARENTERAL at 11:55

## 2023-02-02 RX ADMIN — SITAGLIPTIN SCH MG: 100 TABLET, FILM COATED ORAL at 08:43

## 2023-02-02 RX ADMIN — GLIPIZIDE SCH MG: 5 TABLET ORAL at 08:44

## 2023-02-03 VITALS — DIASTOLIC BLOOD PRESSURE: 67 MMHG | SYSTOLIC BLOOD PRESSURE: 160 MMHG

## 2023-02-03 VITALS — SYSTOLIC BLOOD PRESSURE: 154 MMHG | DIASTOLIC BLOOD PRESSURE: 55 MMHG

## 2023-02-03 VITALS — DIASTOLIC BLOOD PRESSURE: 58 MMHG | SYSTOLIC BLOOD PRESSURE: 160 MMHG

## 2023-02-03 VITALS — DIASTOLIC BLOOD PRESSURE: 54 MMHG | SYSTOLIC BLOOD PRESSURE: 155 MMHG

## 2023-02-03 VITALS — SYSTOLIC BLOOD PRESSURE: 156 MMHG | DIASTOLIC BLOOD PRESSURE: 65 MMHG

## 2023-02-03 VITALS — SYSTOLIC BLOOD PRESSURE: 71 MMHG | DIASTOLIC BLOOD PRESSURE: 60 MMHG

## 2023-02-03 VITALS — DIASTOLIC BLOOD PRESSURE: 52 MMHG | SYSTOLIC BLOOD PRESSURE: 137 MMHG

## 2023-02-03 VITALS — DIASTOLIC BLOOD PRESSURE: 55 MMHG | SYSTOLIC BLOOD PRESSURE: 144 MMHG

## 2023-02-03 VITALS — DIASTOLIC BLOOD PRESSURE: 54 MMHG | SYSTOLIC BLOOD PRESSURE: 133 MMHG

## 2023-02-03 VITALS — DIASTOLIC BLOOD PRESSURE: 59 MMHG | SYSTOLIC BLOOD PRESSURE: 153 MMHG

## 2023-02-03 VITALS — SYSTOLIC BLOOD PRESSURE: 115 MMHG | DIASTOLIC BLOOD PRESSURE: 40 MMHG

## 2023-02-03 VITALS — DIASTOLIC BLOOD PRESSURE: 58 MMHG | SYSTOLIC BLOOD PRESSURE: 153 MMHG

## 2023-02-03 VITALS — SYSTOLIC BLOOD PRESSURE: 145 MMHG | DIASTOLIC BLOOD PRESSURE: 59 MMHG

## 2023-02-03 VITALS — SYSTOLIC BLOOD PRESSURE: 141 MMHG | DIASTOLIC BLOOD PRESSURE: 60 MMHG

## 2023-02-03 VITALS — DIASTOLIC BLOOD PRESSURE: 50 MMHG | SYSTOLIC BLOOD PRESSURE: 131 MMHG

## 2023-02-03 VITALS — DIASTOLIC BLOOD PRESSURE: 74 MMHG | SYSTOLIC BLOOD PRESSURE: 157 MMHG

## 2023-02-03 VITALS — DIASTOLIC BLOOD PRESSURE: 55 MMHG | SYSTOLIC BLOOD PRESSURE: 121 MMHG

## 2023-02-03 VITALS — SYSTOLIC BLOOD PRESSURE: 153 MMHG | DIASTOLIC BLOOD PRESSURE: 67 MMHG

## 2023-02-03 VITALS — SYSTOLIC BLOOD PRESSURE: 137 MMHG | DIASTOLIC BLOOD PRESSURE: 53 MMHG

## 2023-02-03 VITALS — DIASTOLIC BLOOD PRESSURE: 56 MMHG | SYSTOLIC BLOOD PRESSURE: 155 MMHG

## 2023-02-03 VITALS — DIASTOLIC BLOOD PRESSURE: 77 MMHG | SYSTOLIC BLOOD PRESSURE: 143 MMHG

## 2023-02-03 VITALS — DIASTOLIC BLOOD PRESSURE: 73 MMHG | SYSTOLIC BLOOD PRESSURE: 161 MMHG

## 2023-02-03 VITALS — DIASTOLIC BLOOD PRESSURE: 65 MMHG | SYSTOLIC BLOOD PRESSURE: 139 MMHG

## 2023-02-03 VITALS — SYSTOLIC BLOOD PRESSURE: 157 MMHG | DIASTOLIC BLOOD PRESSURE: 58 MMHG

## 2023-02-03 VITALS — DIASTOLIC BLOOD PRESSURE: 62 MMHG | SYSTOLIC BLOOD PRESSURE: 153 MMHG

## 2023-02-03 VITALS — SYSTOLIC BLOOD PRESSURE: 165 MMHG | DIASTOLIC BLOOD PRESSURE: 127 MMHG

## 2023-02-03 VITALS — SYSTOLIC BLOOD PRESSURE: 149 MMHG | DIASTOLIC BLOOD PRESSURE: 57 MMHG

## 2023-02-03 VITALS — SYSTOLIC BLOOD PRESSURE: 129 MMHG | DIASTOLIC BLOOD PRESSURE: 50 MMHG

## 2023-02-03 LAB
ANION GAP SERPL CALC-SCNC: 9.9 MMOL/L (ref 8–16)
BASOPHILS # BLD AUTO: 0.1 10*3/UL (ref 0–0.1)
BASOPHILS NFR BLD AUTO: 1 % (ref 0–1)
BUN SERPL-MCNC: 19 MG/DL (ref 7–26)
BUN/CREAT SERPL: 13 (ref 6–25)
CALCIUM SERPL-MCNC: 8.1 MG/DL (ref 8.4–10.2)
CHLORIDE SERPL-SCNC: 108 MMOL/L (ref 98–107)
CO2 SERPL-SCNC: 24 MMOL/L (ref 22–29)
DEPRECATED NEUTROPHILS # BLD AUTO: 4.9 10*3/UL (ref 2.1–6.9)
EOSINOPHIL # BLD AUTO: 0.4 10*3/UL (ref 0–0.4)
EOSINOPHIL NFR BLD AUTO: 5.8 % (ref 0–6)
ERYTHROCYTE [DISTWIDTH] IN CORD BLOOD: 11.9 % (ref 11.7–14.4)
GLUCOSE SERPLBLD-MCNC: 298 MG/DL (ref 74–118)
HCT VFR BLD AUTO: 27.1 % (ref 34.2–44.1)
HGB BLD-MCNC: 8.2 G/DL (ref 12–16)
LYMPHOCYTES # BLD: 1 10*3/UL (ref 1–3.2)
LYMPHOCYTES NFR BLD AUTO: 14.6 % (ref 18–39.1)
MCH RBC QN AUTO: 30.4 PG (ref 28–32)
MCHC RBC AUTO-ENTMCNC: 30.3 G/DL (ref 31–35)
MCV RBC AUTO: 100.4 FL (ref 81–99)
MONOCYTES # BLD AUTO: 0.6 10*3/UL (ref 0.2–0.8)
MONOCYTES NFR BLD AUTO: 8.2 % (ref 4.4–11.3)
NEUTS SEG NFR BLD AUTO: 70 % (ref 38.7–80)
PLATELET # BLD AUTO: 286 X10E3/UL (ref 140–360)
POTASSIUM SERPL-SCNC: 3.9 MMOL/L (ref 3.5–5.1)
RBC # BLD AUTO: 2.7 X10E6/UL (ref 3.6–5.1)
SODIUM SERPL-SCNC: 138 MMOL/L (ref 136–145)

## 2023-02-03 PROCEDURE — 047L3Z1 DILATION OF LEFT FEMORAL ARTERY USING DRUG-COATED BALLOON, PERCUTANEOUS APPROACH: ICD-10-PCS | Performed by: INTERNAL MEDICINE

## 2023-02-03 PROCEDURE — 047N3ZZ DILATION OF LEFT POPLITEAL ARTERY, PERCUTANEOUS APPROACH: ICD-10-PCS | Performed by: INTERNAL MEDICINE

## 2023-02-03 PROCEDURE — B41G1ZZ FLUOROSCOPY OF LEFT LOWER EXTREMITY ARTERIES USING LOW OSMOLAR CONTRAST: ICD-10-PCS | Performed by: INTERNAL MEDICINE

## 2023-02-03 PROCEDURE — B41F1ZZ FLUOROSCOPY OF RIGHT LOWER EXTREMITY ARTERIES USING LOW OSMOLAR CONTRAST: ICD-10-PCS | Performed by: INTERNAL MEDICINE

## 2023-02-03 RX ADMIN — SITAGLIPTIN SCH MG: 100 TABLET, FILM COATED ORAL at 08:52

## 2023-02-03 RX ADMIN — INSULIN HUMAN SCH UNIT: 100 INJECTION, SOLUTION PARENTERAL at 21:00

## 2023-02-03 RX ADMIN — GLIPIZIDE SCH MG: 5 TABLET ORAL at 08:51

## 2023-02-03 RX ADMIN — INSULIN HUMAN SCH UNIT: 100 INJECTION, SOLUTION PARENTERAL at 16:30

## 2023-02-03 RX ADMIN — Medication SCH MG: at 23:13

## 2023-02-03 RX ADMIN — INSULIN HUMAN SCH UNIT: 100 INJECTION, SOLUTION PARENTERAL at 11:15

## 2023-02-03 RX ADMIN — CLOPIDOGREL BISULFATE SCH MG: 75 TABLET, FILM COATED ORAL at 08:51

## 2023-02-03 RX ADMIN — BENZONATATE PRN MG: 100 CAPSULE ORAL at 23:23

## 2023-02-03 RX ADMIN — SODIUM CHLORIDE SCH MLS/HR: 9 INJECTION, SOLUTION INTRAVENOUS at 23:13

## 2023-02-03 RX ADMIN — SODIUM CHLORIDE SCH MLS/HR: 9 INJECTION, SOLUTION INTRAVENOUS at 14:15

## 2023-02-03 RX ADMIN — INSULIN GLARGINE SCH UNITS: 100 INJECTION, SOLUTION SUBCUTANEOUS at 23:23

## 2023-02-03 RX ADMIN — INSULIN HUMAN SCH UNIT: 100 INJECTION, SOLUTION PARENTERAL at 08:58

## 2023-02-03 RX ADMIN — ASPIRIN 81 MG CHEWABLE TABLET SCH MG: 81 TABLET CHEWABLE at 08:51

## 2023-02-04 VITALS — SYSTOLIC BLOOD PRESSURE: 135 MMHG | DIASTOLIC BLOOD PRESSURE: 61 MMHG

## 2023-02-04 VITALS — SYSTOLIC BLOOD PRESSURE: 106 MMHG | DIASTOLIC BLOOD PRESSURE: 49 MMHG

## 2023-02-04 VITALS — DIASTOLIC BLOOD PRESSURE: 58 MMHG | SYSTOLIC BLOOD PRESSURE: 142 MMHG

## 2023-02-04 VITALS — DIASTOLIC BLOOD PRESSURE: 52 MMHG | SYSTOLIC BLOOD PRESSURE: 133 MMHG

## 2023-02-04 VITALS — SYSTOLIC BLOOD PRESSURE: 115 MMHG | DIASTOLIC BLOOD PRESSURE: 97 MMHG

## 2023-02-04 VITALS — DIASTOLIC BLOOD PRESSURE: 52 MMHG | SYSTOLIC BLOOD PRESSURE: 123 MMHG

## 2023-02-04 VITALS — DIASTOLIC BLOOD PRESSURE: 82 MMHG | SYSTOLIC BLOOD PRESSURE: 100 MMHG

## 2023-02-04 VITALS — SYSTOLIC BLOOD PRESSURE: 140 MMHG | DIASTOLIC BLOOD PRESSURE: 55 MMHG

## 2023-02-04 VITALS — SYSTOLIC BLOOD PRESSURE: 126 MMHG | DIASTOLIC BLOOD PRESSURE: 61 MMHG

## 2023-02-04 VITALS — DIASTOLIC BLOOD PRESSURE: 45 MMHG | SYSTOLIC BLOOD PRESSURE: 101 MMHG

## 2023-02-04 VITALS — SYSTOLIC BLOOD PRESSURE: 142 MMHG | DIASTOLIC BLOOD PRESSURE: 54 MMHG

## 2023-02-04 VITALS — SYSTOLIC BLOOD PRESSURE: 93 MMHG | DIASTOLIC BLOOD PRESSURE: 40 MMHG

## 2023-02-04 VITALS — SYSTOLIC BLOOD PRESSURE: 128 MMHG | DIASTOLIC BLOOD PRESSURE: 60 MMHG

## 2023-02-04 VITALS — DIASTOLIC BLOOD PRESSURE: 52 MMHG | SYSTOLIC BLOOD PRESSURE: 128 MMHG

## 2023-02-04 VITALS — SYSTOLIC BLOOD PRESSURE: 138 MMHG | DIASTOLIC BLOOD PRESSURE: 59 MMHG

## 2023-02-04 VITALS — DIASTOLIC BLOOD PRESSURE: 53 MMHG | SYSTOLIC BLOOD PRESSURE: 139 MMHG

## 2023-02-04 VITALS — SYSTOLIC BLOOD PRESSURE: 144 MMHG | DIASTOLIC BLOOD PRESSURE: 66 MMHG

## 2023-02-04 VITALS — DIASTOLIC BLOOD PRESSURE: 48 MMHG | SYSTOLIC BLOOD PRESSURE: 117 MMHG

## 2023-02-04 VITALS — DIASTOLIC BLOOD PRESSURE: 61 MMHG | SYSTOLIC BLOOD PRESSURE: 142 MMHG

## 2023-02-04 VITALS — SYSTOLIC BLOOD PRESSURE: 128 MMHG | DIASTOLIC BLOOD PRESSURE: 52 MMHG

## 2023-02-04 VITALS — SYSTOLIC BLOOD PRESSURE: 129 MMHG | DIASTOLIC BLOOD PRESSURE: 56 MMHG

## 2023-02-04 VITALS — DIASTOLIC BLOOD PRESSURE: 54 MMHG | SYSTOLIC BLOOD PRESSURE: 127 MMHG

## 2023-02-04 VITALS — SYSTOLIC BLOOD PRESSURE: 123 MMHG | DIASTOLIC BLOOD PRESSURE: 48 MMHG

## 2023-02-04 VITALS — DIASTOLIC BLOOD PRESSURE: 46 MMHG | SYSTOLIC BLOOD PRESSURE: 112 MMHG

## 2023-02-04 VITALS — DIASTOLIC BLOOD PRESSURE: 55 MMHG | SYSTOLIC BLOOD PRESSURE: 129 MMHG

## 2023-02-04 LAB
ALBUMIN SERPL-MCNC: 2.3 G/DL (ref 3.5–5)
ALBUMIN/GLOB SERPL: 0.7 {RATIO} (ref 0.8–2)
ALP SERPL-CCNC: 89 IU/L (ref 40–150)
ALT SERPL-CCNC: 9 IU/L (ref 0–55)
ANION GAP SERPL CALC-SCNC: 9.8 MMOL/L (ref 8–16)
BASOPHILS # BLD AUTO: 0.1 10*3/UL (ref 0–0.1)
BASOPHILS NFR BLD AUTO: 0.7 % (ref 0–1)
BUN SERPL-MCNC: 14 MG/DL (ref 7–26)
BUN/CREAT SERPL: 14 (ref 6–25)
CALCIUM SERPL-MCNC: 7.8 MG/DL (ref 8.4–10.2)
CHLORIDE SERPL-SCNC: 110 MMOL/L (ref 98–107)
CO2 SERPL-SCNC: 22 MMOL/L (ref 22–29)
DEPRECATED NEUTROPHILS # BLD AUTO: 6.7 10*3/UL (ref 2.1–6.9)
EOSINOPHIL # BLD AUTO: 0.4 10*3/UL (ref 0–0.4)
EOSINOPHIL NFR BLD AUTO: 5.1 % (ref 0–6)
ERYTHROCYTE [DISTWIDTH] IN CORD BLOOD: 11.6 % (ref 11.7–14.4)
GLOBULIN PLAS-MCNC: 3.4 G/DL (ref 2.3–3.5)
GLUCOSE SERPLBLD-MCNC: 233 MG/DL (ref 74–118)
HCT VFR BLD AUTO: 28.7 % (ref 34.2–44.1)
HGB BLD-MCNC: 8.8 G/DL (ref 12–16)
LYMPHOCYTES # BLD: 0.7 10*3/UL (ref 1–3.2)
LYMPHOCYTES NFR BLD AUTO: 8.2 % (ref 18–39.1)
MCH RBC QN AUTO: 30.9 PG (ref 28–32)
MCHC RBC AUTO-ENTMCNC: 30.7 G/DL (ref 31–35)
MCV RBC AUTO: 100.7 FL (ref 81–99)
MONOCYTES # BLD AUTO: 0.4 10*3/UL (ref 0.2–0.8)
MONOCYTES NFR BLD AUTO: 5.3 % (ref 4.4–11.3)
NEUTS SEG NFR BLD AUTO: 80.3 % (ref 38.7–80)
PLATELET # BLD AUTO: 313 X10E3/UL (ref 140–360)
POTASSIUM SERPL-SCNC: 3.8 MMOL/L (ref 3.5–5.1)
RBC # BLD AUTO: 2.85 X10E6/UL (ref 3.6–5.1)
SODIUM SERPL-SCNC: 138 MMOL/L (ref 136–145)

## 2023-02-04 RX ADMIN — BENZONATATE PRN MG: 100 CAPSULE ORAL at 20:19

## 2023-02-04 RX ADMIN — INSULIN HUMAN SCH UNIT: 100 INJECTION, SOLUTION PARENTERAL at 08:03

## 2023-02-04 RX ADMIN — SITAGLIPTIN SCH MG: 100 TABLET, FILM COATED ORAL at 08:02

## 2023-02-04 RX ADMIN — INSULIN HUMAN SCH UNIT: 100 INJECTION, SOLUTION PARENTERAL at 16:50

## 2023-02-04 RX ADMIN — INSULIN HUMAN SCH UNIT: 100 INJECTION, SOLUTION PARENTERAL at 11:54

## 2023-02-04 RX ADMIN — INSULIN GLARGINE SCH UNITS: 100 INJECTION, SOLUTION SUBCUTANEOUS at 20:22

## 2023-02-04 RX ADMIN — GLIPIZIDE SCH MG: 5 TABLET ORAL at 08:01

## 2023-02-04 RX ADMIN — SODIUM CHLORIDE SCH MLS/HR: 9 INJECTION, SOLUTION INTRAVENOUS at 05:44

## 2023-02-04 RX ADMIN — ASPIRIN 81 MG CHEWABLE TABLET SCH MG: 81 TABLET CHEWABLE at 08:01

## 2023-02-04 RX ADMIN — SODIUM CHLORIDE SCH MLS/HR: 9 INJECTION, SOLUTION INTRAVENOUS at 14:00

## 2023-02-04 RX ADMIN — Medication SCH MG: at 20:19

## 2023-02-04 RX ADMIN — BIMATOPROST SCH ML: 0.3 SOLUTION/ DROPS OPHTHALMIC at 20:20

## 2023-02-04 RX ADMIN — CLOPIDOGREL BISULFATE SCH MG: 75 TABLET, FILM COATED ORAL at 08:02

## 2023-02-04 RX ADMIN — INSULIN HUMAN SCH UNIT: 100 INJECTION, SOLUTION PARENTERAL at 20:22

## 2023-02-04 RX ADMIN — BIMATOPROST SCH ML: 0.3 SOLUTION/ DROPS OPHTHALMIC at 20:23

## 2023-02-05 VITALS — DIASTOLIC BLOOD PRESSURE: 50 MMHG | SYSTOLIC BLOOD PRESSURE: 111 MMHG

## 2023-02-05 VITALS — DIASTOLIC BLOOD PRESSURE: 63 MMHG | SYSTOLIC BLOOD PRESSURE: 155 MMHG

## 2023-02-05 VITALS — SYSTOLIC BLOOD PRESSURE: 111 MMHG | DIASTOLIC BLOOD PRESSURE: 50 MMHG

## 2023-02-05 VITALS — DIASTOLIC BLOOD PRESSURE: 57 MMHG | SYSTOLIC BLOOD PRESSURE: 145 MMHG

## 2023-02-05 VITALS — SYSTOLIC BLOOD PRESSURE: 149 MMHG | DIASTOLIC BLOOD PRESSURE: 61 MMHG

## 2023-02-05 VITALS — DIASTOLIC BLOOD PRESSURE: 58 MMHG | SYSTOLIC BLOOD PRESSURE: 135 MMHG

## 2023-02-05 VITALS — DIASTOLIC BLOOD PRESSURE: 61 MMHG | SYSTOLIC BLOOD PRESSURE: 149 MMHG

## 2023-02-05 VITALS — DIASTOLIC BLOOD PRESSURE: 46 MMHG | SYSTOLIC BLOOD PRESSURE: 115 MMHG

## 2023-02-05 VITALS — SYSTOLIC BLOOD PRESSURE: 146 MMHG | DIASTOLIC BLOOD PRESSURE: 60 MMHG

## 2023-02-05 VITALS — DIASTOLIC BLOOD PRESSURE: 47 MMHG | SYSTOLIC BLOOD PRESSURE: 109 MMHG

## 2023-02-05 VITALS — DIASTOLIC BLOOD PRESSURE: 66 MMHG | SYSTOLIC BLOOD PRESSURE: 142 MMHG

## 2023-02-05 VITALS — SYSTOLIC BLOOD PRESSURE: 100 MMHG | DIASTOLIC BLOOD PRESSURE: 39 MMHG

## 2023-02-05 VITALS — DIASTOLIC BLOOD PRESSURE: 53 MMHG | SYSTOLIC BLOOD PRESSURE: 123 MMHG

## 2023-02-05 RX ADMIN — ASPIRIN 81 MG CHEWABLE TABLET SCH MG: 81 TABLET CHEWABLE at 08:10

## 2023-02-05 RX ADMIN — INSULIN HUMAN SCH UNIT: 100 INJECTION, SOLUTION PARENTERAL at 21:02

## 2023-02-05 RX ADMIN — SITAGLIPTIN SCH MG: 100 TABLET, FILM COATED ORAL at 08:09

## 2023-02-05 RX ADMIN — BIMATOPROST SCH ML: 0.3 SOLUTION/ DROPS OPHTHALMIC at 20:46

## 2023-02-05 RX ADMIN — SODIUM CHLORIDE SCH MLS/HR: 9 INJECTION, SOLUTION INTRAVENOUS at 08:10

## 2023-02-05 RX ADMIN — INSULIN HUMAN SCH UNIT: 100 INJECTION, SOLUTION PARENTERAL at 08:11

## 2023-02-05 RX ADMIN — INSULIN HUMAN SCH UNIT: 100 INJECTION, SOLUTION PARENTERAL at 11:43

## 2023-02-05 RX ADMIN — Medication SCH MG: at 20:46

## 2023-02-05 RX ADMIN — INSULIN HUMAN SCH UNIT: 100 INJECTION, SOLUTION PARENTERAL at 16:30

## 2023-02-05 RX ADMIN — CLOPIDOGREL BISULFATE SCH MG: 75 TABLET, FILM COATED ORAL at 08:09

## 2023-02-05 RX ADMIN — INSULIN GLARGINE SCH UNITS: 100 INJECTION, SOLUTION SUBCUTANEOUS at 21:02

## 2023-02-05 RX ADMIN — GLIPIZIDE SCH MG: 5 TABLET ORAL at 08:09

## 2023-02-05 RX ADMIN — SODIUM CHLORIDE SCH MLS/HR: 9 INJECTION, SOLUTION INTRAVENOUS at 00:18

## 2023-02-06 VITALS — DIASTOLIC BLOOD PRESSURE: 54 MMHG | SYSTOLIC BLOOD PRESSURE: 104 MMHG

## 2023-02-06 VITALS — SYSTOLIC BLOOD PRESSURE: 111 MMHG | DIASTOLIC BLOOD PRESSURE: 47 MMHG

## 2023-02-06 VITALS — DIASTOLIC BLOOD PRESSURE: 50 MMHG | SYSTOLIC BLOOD PRESSURE: 104 MMHG

## 2023-02-06 VITALS — DIASTOLIC BLOOD PRESSURE: 72 MMHG | SYSTOLIC BLOOD PRESSURE: 132 MMHG

## 2023-02-06 VITALS — DIASTOLIC BLOOD PRESSURE: 54 MMHG | SYSTOLIC BLOOD PRESSURE: 124 MMHG

## 2023-02-06 VITALS — DIASTOLIC BLOOD PRESSURE: 49 MMHG | SYSTOLIC BLOOD PRESSURE: 119 MMHG

## 2023-02-06 LAB
ANION GAP SERPL CALC-SCNC: 12.1 MMOL/L (ref 8–16)
BASOPHILS # BLD AUTO: 0.1 10*3/UL (ref 0–0.1)
BASOPHILS NFR BLD AUTO: 0.8 % (ref 0–1)
BUN SERPL-MCNC: 12 MG/DL (ref 7–26)
BUN/CREAT SERPL: 11 (ref 6–25)
CALCIUM SERPL-MCNC: 7.6 MG/DL (ref 8.4–10.2)
CHLORIDE SERPL-SCNC: 111 MMOL/L (ref 98–107)
CO2 SERPL-SCNC: 21 MMOL/L (ref 22–29)
DEPRECATED NEUTROPHILS # BLD AUTO: 6.3 10*3/UL (ref 2.1–6.9)
EOSINOPHIL # BLD AUTO: 0.5 10*3/UL (ref 0–0.4)
EOSINOPHIL NFR BLD AUTO: 5.5 % (ref 0–6)
ERYTHROCYTE [DISTWIDTH] IN CORD BLOOD: 12.5 % (ref 11.7–14.4)
GLUCOSE SERPLBLD-MCNC: 87 MG/DL (ref 74–118)
HCT VFR BLD AUTO: 25.6 % (ref 34.2–44.1)
HGB BLD-MCNC: 8.3 G/DL (ref 12–16)
LYMPHOCYTES # BLD: 1.1 10*3/UL (ref 1–3.2)
LYMPHOCYTES NFR BLD AUTO: 12.7 % (ref 18–39.1)
MCH RBC QN AUTO: 30.3 PG (ref 28–32)
MCHC RBC AUTO-ENTMCNC: 32.4 G/DL (ref 31–35)
MCV RBC AUTO: 93.4 FL (ref 81–99)
MONOCYTES # BLD AUTO: 0.9 10*3/UL (ref 0.2–0.8)
MONOCYTES NFR BLD AUTO: 10.1 % (ref 4.4–11.3)
NEUTS SEG NFR BLD AUTO: 70.2 % (ref 38.7–80)
PLATELET # BLD AUTO: 279 X10E3/UL (ref 140–360)
POTASSIUM SERPL-SCNC: 3.1 MMOL/L (ref 3.5–5.1)
RBC # BLD AUTO: 2.74 X10E6/UL (ref 3.6–5.1)
SODIUM SERPL-SCNC: 141 MMOL/L (ref 136–145)

## 2023-02-06 RX ADMIN — INSULIN HUMAN SCH UNIT: 100 INJECTION, SOLUTION PARENTERAL at 17:32

## 2023-02-06 RX ADMIN — SITAGLIPTIN SCH MG: 100 TABLET, FILM COATED ORAL at 08:57

## 2023-02-06 RX ADMIN — ASPIRIN 81 MG CHEWABLE TABLET SCH MG: 81 TABLET CHEWABLE at 08:57

## 2023-02-06 RX ADMIN — GLIPIZIDE SCH MG: 5 TABLET ORAL at 08:57

## 2023-02-06 RX ADMIN — Medication SCH MG: at 21:44

## 2023-02-06 RX ADMIN — INSULIN HUMAN SCH UNIT: 100 INJECTION, SOLUTION PARENTERAL at 11:30

## 2023-02-06 RX ADMIN — INSULIN HUMAN SCH UNIT: 100 INJECTION, SOLUTION PARENTERAL at 07:30

## 2023-02-06 RX ADMIN — BIMATOPROST SCH ML: 0.3 SOLUTION/ DROPS OPHTHALMIC at 21:39

## 2023-02-06 RX ADMIN — CLOPIDOGREL BISULFATE SCH MG: 75 TABLET, FILM COATED ORAL at 08:57

## 2023-02-06 RX ADMIN — INSULIN GLARGINE SCH UNITS: 100 INJECTION, SOLUTION SUBCUTANEOUS at 21:47

## 2023-02-06 RX ADMIN — SODIUM CHLORIDE SCH MLS/HR: 9 INJECTION, SOLUTION INTRAVENOUS at 08:58

## 2023-02-06 RX ADMIN — INSULIN HUMAN SCH UNIT: 100 INJECTION, SOLUTION PARENTERAL at 21:46

## 2023-02-07 VITALS — SYSTOLIC BLOOD PRESSURE: 128 MMHG | DIASTOLIC BLOOD PRESSURE: 49 MMHG

## 2023-02-07 VITALS — SYSTOLIC BLOOD PRESSURE: 113 MMHG | DIASTOLIC BLOOD PRESSURE: 48 MMHG

## 2023-02-07 VITALS — SYSTOLIC BLOOD PRESSURE: 121 MMHG | DIASTOLIC BLOOD PRESSURE: 52 MMHG

## 2023-02-07 RX ADMIN — ASPIRIN 81 MG CHEWABLE TABLET SCH MG: 81 TABLET CHEWABLE at 10:19

## 2023-02-07 RX ADMIN — INSULIN HUMAN SCH UNIT: 100 INJECTION, SOLUTION PARENTERAL at 07:30

## 2023-02-07 RX ADMIN — CLOPIDOGREL BISULFATE SCH MG: 75 TABLET, FILM COATED ORAL at 10:19

## 2023-02-07 RX ADMIN — SODIUM CHLORIDE SCH MLS/HR: 9 INJECTION, SOLUTION INTRAVENOUS at 10:19

## 2023-02-07 RX ADMIN — GLIPIZIDE SCH MG: 5 TABLET ORAL at 10:19

## 2023-02-07 RX ADMIN — SITAGLIPTIN SCH MG: 100 TABLET, FILM COATED ORAL at 10:19

## 2023-10-11 LAB
ANION GAP SERPL CALC-SCNC: 13.1 MMOL/L (ref 8–16)
BASOPHILS # BLD AUTO: 0.1 10*3/UL (ref 0–0.1)
BASOPHILS NFR BLD AUTO: 0.5 % (ref 0–1)
BUN SERPL-MCNC: 20 MG/DL (ref 7–26)
BUN/CREAT SERPL: 13 (ref 6–25)
CALCIUM SERPL-MCNC: 9 MG/DL (ref 8.4–10.2)
CHLORIDE SERPL-SCNC: 101 MMOL/L (ref 98–107)
CO2 SERPL-SCNC: 25 MMOL/L (ref 22–29)
DEPRECATED NEUTROPHILS # BLD AUTO: 6.8 10*3/UL (ref 2.1–6.9)
EOSINOPHIL # BLD AUTO: 0.7 10*3/UL (ref 0–0.4)
EOSINOPHIL NFR BLD AUTO: 6.9 % (ref 0–6)
ERYTHROCYTE [DISTWIDTH] IN CORD BLOOD: 12.6 % (ref 11.7–14.4)
GLUCOSE SERPLBLD-MCNC: 199 MG/DL (ref 74–118)
HCT VFR BLD AUTO: 30 % (ref 34.2–44.1)
HGB BLD-MCNC: 10.1 G/DL (ref 12–16)
LYMPHOCYTES # BLD: 1.1 10*3/UL (ref 1–3.2)
LYMPHOCYTES NFR BLD AUTO: 11.6 % (ref 18–39.1)
MCH RBC QN AUTO: 30.9 PG (ref 28–32)
MCHC RBC AUTO-ENTMCNC: 33.7 G/DL (ref 31–35)
MCV RBC AUTO: 91.7 FL (ref 81–99)
MONOCYTES # BLD AUTO: 0.8 10*3/UL (ref 0.2–0.8)
MONOCYTES NFR BLD AUTO: 8.1 % (ref 4.4–11.3)
NEUTS SEG NFR BLD AUTO: 72.6 % (ref 38.7–80)
PLATELET # BLD AUTO: 308 X10E3/UL (ref 140–360)
POTASSIUM SERPL-SCNC: 4.1 MMOL/L (ref 3.5–5.1)
RBC # BLD AUTO: 3.27 X10E6/UL (ref 3.6–5.1)
SODIUM SERPL-SCNC: 135 MMOL/L (ref 136–145)
WBC # BLD: 9.36 X10E3/UL (ref 4.8–10.8)

## 2023-10-16 ENCOUNTER — HOSPITAL ENCOUNTER (OUTPATIENT)
Dept: HOSPITAL 88 - OR | Age: 78
Discharge: HOME | End: 2023-10-16
Attending: PODIATRIST
Payer: MEDICARE

## 2023-10-16 VITALS
OXYGEN SATURATION: 99 % | RESPIRATION RATE: 18 BRPM | SYSTOLIC BLOOD PRESSURE: 130 MMHG | DIASTOLIC BLOOD PRESSURE: 53 MMHG | HEART RATE: 106 BPM

## 2023-10-16 DIAGNOSIS — Z79.02: ICD-10-CM

## 2023-10-16 DIAGNOSIS — I70.1: ICD-10-CM

## 2023-10-16 DIAGNOSIS — Z01.810: ICD-10-CM

## 2023-10-16 DIAGNOSIS — Z01.812: ICD-10-CM

## 2023-10-16 DIAGNOSIS — I10: ICD-10-CM

## 2023-10-16 DIAGNOSIS — Z79.82: ICD-10-CM

## 2023-10-16 DIAGNOSIS — E11.51: ICD-10-CM

## 2023-10-16 DIAGNOSIS — Z95.820: ICD-10-CM

## 2023-10-16 DIAGNOSIS — Z79.899: ICD-10-CM

## 2023-10-16 DIAGNOSIS — Z89.421: ICD-10-CM

## 2023-10-16 DIAGNOSIS — I70.245: ICD-10-CM

## 2023-10-16 DIAGNOSIS — M86.172: Primary | ICD-10-CM

## 2023-10-16 DIAGNOSIS — Z79.84: ICD-10-CM

## 2023-10-16 DIAGNOSIS — L97.526: ICD-10-CM

## 2023-10-16 DIAGNOSIS — I65.23: ICD-10-CM

## 2023-10-16 DIAGNOSIS — Z01.818: ICD-10-CM

## 2023-10-16 DIAGNOSIS — Z89.422: ICD-10-CM

## 2023-10-16 DIAGNOSIS — E78.5: ICD-10-CM

## 2023-10-16 PROCEDURE — 93005 ELECTROCARDIOGRAM TRACING: CPT

## 2023-10-16 PROCEDURE — 71046 X-RAY EXAM CHEST 2 VIEWS: CPT

## 2023-10-16 PROCEDURE — 88305 TISSUE EXAM BY PATHOLOGIST: CPT

## 2023-10-16 PROCEDURE — 80048 BASIC METABOLIC PNL TOTAL CA: CPT

## 2023-10-16 PROCEDURE — 88304 TISSUE EXAM BY PATHOLOGIST: CPT

## 2023-10-16 PROCEDURE — 82948 REAGENT STRIP/BLOOD GLUCOSE: CPT

## 2023-10-16 PROCEDURE — 28820 AMPUTATION OF TOE: CPT

## 2023-10-16 PROCEDURE — 85025 COMPLETE CBC W/AUTO DIFF WBC: CPT

## 2023-10-16 PROCEDURE — 36415 COLL VENOUS BLD VENIPUNCTURE: CPT

## 2023-10-16 PROCEDURE — 88311 DECALCIFY TISSUE: CPT
